# Patient Record
Sex: FEMALE | Race: WHITE | Employment: OTHER | URBAN - METROPOLITAN AREA
[De-identification: names, ages, dates, MRNs, and addresses within clinical notes are randomized per-mention and may not be internally consistent; named-entity substitution may affect disease eponyms.]

---

## 2018-09-27 PROBLEM — E11.21 TYPE 2 DIABETES WITH NEPHROPATHY (HCC): Status: ACTIVE | Noted: 2018-09-27

## 2018-12-01 RX ORDER — LORAZEPAM 0.5 MG/1
TABLET ORAL
Qty: 30 TAB | Refills: 0 | OUTPATIENT
Start: 2018-12-01

## 2019-06-03 RX ORDER — OMEPRAZOLE 20 MG/1
CAPSULE, DELAYED RELEASE ORAL
Qty: 30 CAP | Refills: 0 | Status: SHIPPED | OUTPATIENT
Start: 2019-06-03 | End: 2019-07-15 | Stop reason: SDUPTHER

## 2019-07-15 ENCOUNTER — TELEPHONE (OUTPATIENT)
Dept: GERIATRIC MEDICINE | Age: 84
End: 2019-07-15

## 2019-07-15 RX ORDER — OMEPRAZOLE 20 MG/1
CAPSULE, DELAYED RELEASE ORAL
Qty: 30 CAP | Refills: 0 | Status: SHIPPED | OUTPATIENT
Start: 2019-07-15 | End: 2020-08-27

## 2019-07-16 NOTE — TELEPHONE ENCOUNTER
This is not my patient. I have never seen her. Some how her Omeprazole rx has me attached. I have attempted to decline the refill.

## 2020-02-20 ENCOUNTER — HOSPITAL ENCOUNTER (OUTPATIENT)
Dept: WOUND CARE | Age: 85
Discharge: HOME | End: 2020-02-20
Payer: MEDICARE

## 2020-02-20 VITALS
SYSTOLIC BLOOD PRESSURE: 133 MMHG | RESPIRATION RATE: 16 BRPM | HEART RATE: 96 BPM | DIASTOLIC BLOOD PRESSURE: 71 MMHG | TEMPERATURE: 98.3 F

## 2020-02-20 PROBLEM — S81.811A LACERATION OF RIGHT LOWER EXTREMITY: Status: ACTIVE | Noted: 2020-02-20

## 2020-02-20 PROBLEM — L97.915 NON-PRESSURE CHRONIC ULCER OF RIGHT LOWER LEG WITH MUSCLE INVOLVEMENT WITHOUT EVIDENCE OF NECROSIS (HCC): Status: ACTIVE | Noted: 2020-02-20

## 2020-02-20 PROCEDURE — 99213 OFFICE O/P EST LOW 20 MIN: CPT

## 2020-02-20 PROCEDURE — 11043 DBRDMT MUSC&/FSCA 1ST 20/<: CPT

## 2020-02-20 RX ORDER — AMOXICILLIN 250 MG/1
250 CAPSULE ORAL 3 TIMES DAILY
Qty: 30 CAP | Refills: 0 | Status: SHIPPED | OUTPATIENT
Start: 2020-02-20 | End: 2021-01-08 | Stop reason: ALTCHOICE

## 2020-02-20 NOTE — DISCHARGE INSTRUCTIONS
Discharge Instructions for  [address], [address] [place] [address]  Telephone: [phone]    NAME:  [id number]:  6/9/1932  MEDICAL RECORD NUMBER:  [id number]  DATE:  2/20/2020    Wound Cleansing:   Do not scrub or use excessive force. Cleanse wound prior to applying a clean dressing with:  [x] Normal Saline [x] Keep Wound Dry in Shower    [] Wound Cleanser   [] Cleanse wound with Mild Soap & Water  [] May Shower at Discharge   [] Other:       Dressings:           Wound Location right lower leg   [x] Apply Primary Dressing:       [] MediHoney Gel [] Aquacel ag [] Alginate   [] Collagen [x] Collagen with Silver almita ag   [] Santyl with Moisten saline gauze     [] Hydrocolloid   [] MediHoney Alginate [] Foam with Silver   [] Foam   [x] Hydrofera Blue ready   [] Mepilex Border    [] Moisten with Saline [] Hydrogel [] Mepitel     [] Bactroban/Mupirocin [] Polysporin  [] Other:    [x] Cover and Secure with:     [] Gauze [] Pollo [] Kerlix   [] Ace Wrap [] Cover Roll Tape [] ABD     [x] Other: bordered foam   Avoid contact of tape with skin. [x] Change dressing: [] Daily    [] Every Other Day [] Three times per week   [] Once a week [] Do Not Change Dressing   [x] Other: mondays and thursday     Edema Control:  Apply: [] Compression Stocking []Right Leg []Left Leg   [x] Tubigrip [x]Right Leg Double Layer []Left Leg Double Layer       []Right Leg Single Layer []Left Leg Single Layer   [] SpandaGrip []Right Leg  []Left Leg      []Low compression 5-10 mm/Hg      []Medium compression 10-20 mm/Hg     []High compression  20-30 mm/Hg   every morning immediately when getting up should be applied to affected leg(s) from mid foot to knee making sure to cover the heel. Remove every night before going to bed. [x] Elevate leg(s) above the level of the heart when sitting. [x] Avoid prolonged standing in one place.    [] Elevate arm/hand above the level of the heart []RightArm []LeftArm     Dietary:  [x] Diet as tolerated: [] Calorie Diabetic Diet: [] No Added Salt:  [x] Increase Protein: [] Other:   Activity:  [x] Activity as tolerated:  [] Patient has no activity restrictions     [] Strict Bedrest: [] Remain off Work:     [] May return to full duty work:                                   [] Return to work with restrictions:             Return Appointment:  [] Wound and dressing supply provider:   [x] ECF or Home Healthcare: Referral to [facility]  [] Wound Assessment: [] Physician or NP scheduled for Wound Assessment:   [x] Return Appointment: With Dr. Chad Baltazar  in  [address] [place] [address])  Rx for amoxicillin three times daily. Electronically signed on 2/20/2020 at 9:54 AM     Roberta Amaya 281: Should you experience any significant changes in your wound(s) or have questions about your wound care, please contact the [facility] at [address] [place] [address] 8:00 am - 4:30. If you need help with your wound outside these hours and cannot wait until we are again available, contact your PCP or go to the hospital emergency room. PLEASE NOTE: IF YOU ARE UNABLE TO OBTAIN WOUND SUPPLIES, CONTINUE TO USE THE SUPPLIES YOU HAVE AVAILABLE UNTIL YOU ARE ABLE TO REACH US. IT IS MOST IMPORTANT TO KEEP THE WOUND COVERED AT ALL TIMES.       Physician Signature:_______________________    Date: ___________ Time:  ____________

## 2020-02-20 NOTE — H&P
[address] [place] [address]  HISTORY AND PHYSICAL    Name:  Betty Rasmussen  MR#:  [id number]  :  1932  ACCOUNT #:  [de-identified]  ADMIT DATE:  2020      HISTORY OF PRESENT ILLNESS:  The patient is an 57-year-old female who fell and suffered multiple lacerations without foreign body of the right lateral leg S81.811A, L97.913 on December 15, 2019. She was seen in the emergency room at [facility] and had sutures placed. She then returned to [facility] on  at which time the sutures were removed. Cultures were obtained, which grew E. Coli and normal jerod, for which she has been treated topically but not systemically. PAST MEDICAL HISTORY:  Significant for left breast cancer, fibromyalgia, GERD, elevated cholesterol, osteoporosis, depression, goiter and type 2 diabetes with her most recent A1c of 6.1 on 2020. MEDICATIONS:  Synthroid, lorazepam, Celebrex, niacin, amitriptyline, Paxil, Prilosec, amlodipine. HABITS:  Cigarettes were discontinued in . She denies alcohol. PREVIOUS OPERATIONS:  Left lumpectomy, appendectomy, right total knee replacement, T and A, laminectomy. ALLERGIES:  STATINS WHICH CAUSE MYALGIA, WELCHOL WHICH CAUSES MYALGIA, ZETIA WHICH CAUSE MYALGIA, AND ANASTROZOLE WHICH CAUSES A RASH. FAMILY HISTORY:  Significant for coronary artery heart disease in her mother and brother; cancer in her father with pancreas; brother liver; sister lung; hypertension, sister and brother. The patient presents with an aide who stays with the patient during the day on  and . Her son visits and stays with her every other weekend. Other than that, she has been living alone. She has had multiple falls including one episode where she fell and was under her own bed and was not sure how that happened. She has three walkers in her house but only uses them intermittently.     PHYSICAL EXAMINATION:  VITAL SIGNS:  Her temperature is 98.3, pulse 96, respirations 16, blood pressure 133/71. NEUROLOGIC:  Cranial nerves II through XII grossly intact. HEAD AND NECK:  Oral cavity, oropharynx, palpation of neck is normal.  LUNGS:  Clear to auscultation and percussion. HEART:  Regular rate and rhythm without murmur. ABDOMEN:  Soft, nontender, no organomegaly. BACK:  Nontender to palpation. EXTREMITIES:  Upper extremities:  Equal tone and strength bilaterally. Lower extremities:  She has strong dorsalis pedis and posterior tibialis pulses. ABIs were obtained showing the right 0.99 and the left 1.19. She has a trace of edema on the right lower extremity. WOUND EXAMINATION:  She has a wound of the right lateral lower leg measuring 1.4 x 1.5 x 1 cm. The wound is covered with slough. It was recommended that the wound be debrided. I explained the risks and benefits. The patient and her aide understood and wished to proceed. Therefore, topical Xylocaine 4% gel was applied for 15 minutes. Using a 5-mm ring curette, all devitalized tissue was removed down to healthy bleeding muscle. Hemostasis was achieved with gentle pressure until dry. The wound was irrigated with saline and patted dry and it was coated with a layer of Vashe. ASSESSMENT AND PLAN:  The patient states that her family has a strong history of negative reactions to sulfa and therefore I am going to give her a prescription for amoxicillin 250 mg #30 one p.o. t.i.d. for the E. coli infection of the wound. We are going to dress the wound with Caitlin Ag followed by [facility] Blue and Border Foam along with double Tubigrips. The patient does have compression stockings at home, which she is not currently taking but has tolerated in the past and therefore she can use those if she prefers. I have asked her to keep her leg elevated as much as possible. I asked her to keep the wound dry and not get it wet in the bath or shower.     I called the patient's son because I am quite concerned that she is putting herself at risk by living alone and not having enough support during the day or the nights, and I strongly believe that this woman needs to be in assisted living since she is unable to care for herself adequately and has only intermittent care in the home. Her son understands. He will discuss this with her and come up with a comprehensive plan, hopefully in the near future. I am going to see the patient again in followup in 1 week. Hopefully, we will start noticing an improvement in the wound. I strongly encouraged her to keep her leg elevated as much as she can and that she must use a walker whenever she ambulates in or outside of the home. All questions were answered. Her condition on discharge is stable. If the wound does not improve quickly in spite of gentle compression and leg elevation, we might recommend to her PCP that she be switched from amlodipine to a different class of anti-hypertensive since calcium channel blockers can play a role in lower extremity edema.       Rosita Rachel MD      [id number]  D:  02/20/2020 11:14  T:  02/20/2020 12:46  JOB #:  [id number]  CC:  Tarik West MD

## 2020-02-20 NOTE — WOUND CARE
02/20/20 0931   Wound Leg lower Right   Date First Assessed/Time First Assessed: 02/20/20 0930   Present on Hospital Admission: Yes  Primary Wound Type: Traumatic  Location: Leg lower  Wound Location Orientation: Right   Dressing Status Other (Comment)   Dressing Type Open to air   Wound Length (cm) 1.4 cm   Wound Width (cm) 1.5 cm   Wound Depth (cm) 1 cm   Wound Surface Area (cm^2) 2.1 cm^2   Wound Volume (cm^3) 2.1 cm^3   Condition of Base Pink;Slough   Condition of Edges Open   Drainage Amount Moderate   Drainage Color Serosanguinous   Wound Odor None   Vilma-wound Assessment Intact; Pink     Visit Vitals  /71 (BP 1 Location: Right arm, BP Patient Position: Sitting)   Pulse 96   Temp 98.3 °F (36.8 °C)   Resp 16

## 2020-11-13 ENCOUNTER — HOME HEALTH ADMISSION (OUTPATIENT)
Dept: HOME HEALTH SERVICES | Facility: HOME HEALTH | Age: 85
End: 2020-11-13

## 2021-01-08 ENCOUNTER — OFFICE VISIT (OUTPATIENT)
Dept: NEUROLOGY | Age: 86
End: 2021-01-08
Payer: MEDICARE

## 2021-01-08 VITALS
OXYGEN SATURATION: 98 % | DIASTOLIC BLOOD PRESSURE: 80 MMHG | RESPIRATION RATE: 16 BRPM | SYSTOLIC BLOOD PRESSURE: 140 MMHG | WEIGHT: 166 LBS | BODY MASS INDEX: 28.34 KG/M2 | HEIGHT: 64 IN | HEART RATE: 78 BPM

## 2021-01-08 DIAGNOSIS — R32 URINARY INCONTINENCE, UNSPECIFIED TYPE: ICD-10-CM

## 2021-01-08 DIAGNOSIS — R41.3 MEMORY LOSS: Primary | ICD-10-CM

## 2021-01-08 DIAGNOSIS — Z86.73 HISTORY OF STROKE: ICD-10-CM

## 2021-01-08 PROCEDURE — 99205 OFFICE O/P NEW HI 60 MIN: CPT | Performed by: PSYCHIATRY & NEUROLOGY

## 2021-01-08 PROCEDURE — G8427 DOCREV CUR MEDS BY ELIG CLIN: HCPCS | Performed by: PSYCHIATRY & NEUROLOGY

## 2021-01-08 PROCEDURE — 1100F PTFALLS ASSESS-DOCD GE2>/YR: CPT | Performed by: PSYCHIATRY & NEUROLOGY

## 2021-01-08 PROCEDURE — G8536 NO DOC ELDER MAL SCRN: HCPCS | Performed by: PSYCHIATRY & NEUROLOGY

## 2021-01-08 PROCEDURE — 3288F FALL RISK ASSESSMENT DOCD: CPT | Performed by: PSYCHIATRY & NEUROLOGY

## 2021-01-08 PROCEDURE — 1090F PRES/ABSN URINE INCON ASSESS: CPT | Performed by: PSYCHIATRY & NEUROLOGY

## 2021-01-08 PROCEDURE — G8432 DEP SCR NOT DOC, RNG: HCPCS | Performed by: PSYCHIATRY & NEUROLOGY

## 2021-01-08 PROCEDURE — G8419 CALC BMI OUT NRM PARAM NOF/U: HCPCS | Performed by: PSYCHIATRY & NEUROLOGY

## 2021-01-08 RX ORDER — MEMANTINE HYDROCHLORIDE 10 MG/1
TABLET ORAL
Qty: 180 TAB | Refills: 3 | Status: SHIPPED | OUTPATIENT
Start: 2021-01-08 | End: 2021-02-23 | Stop reason: SINTOL

## 2021-01-08 NOTE — PROGRESS NOTES
Neurology Consult Note      HISTORY PROVIDED BY: patient and caregiver Jessee Kennedy since August, 2020. Chief Complaint:   Chief Complaint   Patient presents with    Memory Loss      Subjective:    Faby Blanton is a 80 y.o. female who presents in consultation for memory loss. Pt reports onset of difficulties about 2 years ago, caregiver reports 4 years at least.  Forgets names, dates, places. Her caregiver reports she loses her train of thought mid-sentence. Poor short term memory, gives example of not remembering a very specific conversation. She is living alone, caregiver is there M-F for various hours during the day. She is not driving since 8915, gave up her license, due to getting lost. [facility] was [facility] at [facility] due to confusion, they suspected UTI, started on an antibiotic. Confusion has improved. +Bladder incontinence. Labs 11/11/20 - , TSH and CMP - unremarkable.      Past Medical History:   Diagnosis Date    Arthritis     Breast cancer ([address]     LEFT BREAST -- Lumpectomy ([facility])    DM (diabetes mellitus) ([address]     Environmental allergies     Fibromyalgia     GERD (gastroesophageal reflux disease)     High cholesterol     Ill-defined condition 12/28/15    RECENT FALL 12/21/15; \"FELLS LIKE SOMETHING CRAWLING UP INSIDE HEAD ON RIGHT SIDE\", HAS HAD HEADACHE/ DIZZINESS SINCE    Ill-defined condition 12/28/15    STATES NEEDS ANOTHER SPINE SURGERY--RUPTURED [address]; HAS \"GROWTH\" AT END OF SPINE; HAS NECK PAIN    Ill-defined condition     NO BP/VENIPUNCTURE LEFT ARM    Osteopenia     Osteoporosis     Other ill-defined conditions(799.89)     trouble swallowing/wt loss - 10 pound loss in two months (6/26/14 pt states she has had this problem for a very long time)    Psychiatric disorder     hx depression    Stroke [facility])     Seen incidentally    Thyroid activity decreased     goiter      Past Surgical History:   Procedure Laterality Date    ENDOSCOPY, COLON, DIAGNOSTIC 2013    Neg. No f/u needed. Kevin Ramos).    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, would [employer] in [place]: Not on file    Food insecurity     Worry: Not on file     Inability: Not on file   Blog[employer] needs     Medical: Not on file     Non-medical: Not on file   Tobacco Use    Smoking status: Former Smoker     Packs/day: 0.00     Quit date: 1964     Years since quittin.3    Smokeless tobacco: Never Used   Substance and Sexual Activity    Alcohol use: No    Drug use: No    Sexual activity: Not on file   Lifestyle    Physical activity     Days per week: Not on file     Minutes per session: Not on file    Stress: Not on file   Relationships    Social connections     Talks on phone: Not on file     Gets together: Not on file     Attends Rastafarian service: Not on file     Active member of club or organization: Not on file     Attends meetings of clubs or organizations: Not on file     Relationship status: Not on file    Intimate partner violence     Fear of current or ex partner: Not on file     Emotionally abused: Not on file     Physically abused: Not on file     Forced sexual activity: Not on file   Other Topics Concern    Not on file   Social History Narrative    [facility]     Family History   Problem Relation Age of Onset    Heart Disease Mother     Cancer Father         pancreatic    Heart Disease Brother 46        CAD    Hypertension Brother     Stroke Brother     Heart Disease Maternal Grandfather     Heart Disease Brother     Hypertension Brother     Stroke Brother     Heart Disease Brother     Hypertension Brother     Cancer Brother         liver    Heart Disease Brother     Cancer Brother         lung    Heart Surgery Brother     Hypertension Brother     Hypertension Sister    [de-identified] Cancer Sister         colon    Cancer Sister         lung    Anesth Problems Neg Hx          Objective:   Review of Systems   Constitutional: Positive for malaise/fatigue. HENT: Negative. Eyes: Negative. Respiratory: Negative. Cardiovascular: Negative. Gastrointestinal: Positive for constipation. Genitourinary: Negative. Incontinence   Musculoskeletal: Positive for joint pain and myalgias. Skin: Negative. Neurological: Positive for weakness. Endo/Heme/Allergies: Negative. Psychiatric/Behavioral: Positive for memory loss. The patient is nervous/anxious. Allergies   Allergen Reactions    Adhesive Rash    Mold Other (comments)     GRASS, WEEDS, DUST: CONGESTION    Statins-Hmg-Coa Reductase Inhibitors Myalgia    Welchol [Colesevelam] Myalgia    Zetia [Ezetimibe] Myalgia    Anastrozole Rash        Meds:  Outpatient Medications Prior to Visit   Medication Sig Dispense Refill    niacin ER (NIASPAN) 500 mg tablet TAKE ONE TABLET BY MOUTH NIGHTLY 30 Tab 11    celecoxib (CELEBREX) 200 mg capsule TAKE ONE CAPSULE BY MOUTH EVERY DAY 30 Cap 5    LORazepam (ATIVAN) 1 mg tablet Take 1 Tab by mouth three (3) times daily as needed for Anxiety. Max Daily Amount: 3 tablets 90 Tab 3    PARoxetine (PAXIL) 20 mg tablet Take 1 Tab by mouth daily.  30 Tab 11    amitriptyline (ELAVIL) 75 mg tablet TAKE ONE TABLET BY MOUTH AT BEDTIME 90 Tab 3    levothyroxine (SYNTHROID) 75 mcg tablet TAKE ONE TABLET BY MOUTH EVERY DAY 90 Tab 3    omeprazole (PRILOSEC) 20 mg capsule TAKE ONE CAPSULE BY MOUTH EVERY DAY 90 Cap 3    amoxicillin (AMOXIL) 250 mg capsule Take 1 Cap by mouth three (3) times daily. 30 Cap 0    senna-docusate (SENOKOT-S) 8.6-50 mg per tablet Take 2 Tabs by mouth daily.  polyethylene glycol (MIRALAX) 17 gram/dose powder Take 17 g by mouth daily.  docusate sodium (COLACE) 100 mg capsule Take 100 mg by mouth two (2) times a day. No facility-administered medications prior to visit. Imaging:  MRI Results (most recent):  Results from [place] encounter on 05/04/16   MRI LUMB SPINE WO CONT    Narrative **Final Report**       ICD Codes / Adm. Diagnosis: 724.02  M48.06 / Spinal stenosis, lumbar region    Spinal stenosis, lumbar arabella  Examination:  MR ZHANG SPINE WO CON  - [id number] - May  4 2016 11:48AM  Accession No:  [id number]  Reason:  Spinal stenosis, lumbar region, without neurogenic claudication      REPORT:  EXAM:  MR ZHANG SPINE WO CON    INDICATION:  Spinal stenosis, lumbar region, without neurogenic   claudication. Spinal surgery history. Several falls since knee surgery 3.5   months ago. Back pain. COMPARISON: 12/12/2014 MRI, 2/4/2015 radiographs    TECHNIQUE: MR imaging of the lumbar spine was performed using the following   sequences: sagittal T1, T2, STIR;  axial T1, T2.     CONTRAST:  None. FINDINGS:    Conus position, morphology and signal appear normal. There is normal   vertebral body height. Noted type I signal changes are demonstrated in the   interval at L3-4, with otherwise normal bone signal. Vertebral body heights   are normal. There is interval anterolisthesis at L3-4 measuring 4 mm. Borderline anterolisthesis at L4-5 is again shown as is borderline   retrolisthesis at L2-3. Imaging directly defects are again shown at L4-L5   with artifacts related to bilateral pedicle screws placement at L4, L5 and   S1, connected with bilateral vertical rods. No paraspinal soft tissue mass   is demonstrated. T10-11: Shown on sagittal images. Minimal central disc bulging. No facet   arthropathy, canal stenosis or foraminal stenosis. T11-12: Shown on sagittal images. Mild disc space narrowing and diffuse disc   bulging, greatest centrally. No facet arthropathy, canal stenosis or   foraminal stenosis. T12-L1: Normal disc height. Minimal central disc bulging. No facet   arthropathy, canal stenosis or foraminal stenosis. L1-L2:  Normal disc height. Minimal central disc bulging. No facet   arthropathy, canal stenosis or foraminal stenosis. No change appear    L2-L3:  Nearly normal disc height. Mild leftward lateralizing diffuse disc   bulging and bilateral facet osteoarthrosis. No canal or foraminal stenosis. No change. L3-L4:  Interval moderate disc space narrowing. Moderate diffuse disc   bulging, lateralizing toward left with interval substantial prominence in   left foraminal and left lateral regions. Moderate bilateral facet   osteoarthrosis. Previously demonstrated a synovial cyst no longer evident. Moderate to severe canal stenosis. Severe left and moderate right foraminal   stenosis. L4-L5:  Unchanged moderate to severe disc space narrowing. No canal or left   foraminal stenosis. Mild-moderate right foraminal stenosis unchanged. L5-S1:  Moderate disc space narrowing. No canal or right foraminal stenosis. Mild left foraminal stenosis. No change. IMPRESSION:  Posterior synovial cyst at L3-4 level no longer demonstrated. Progression of   L3-4 disc space narrowing with interval type I discogenic endplate signal   changes. Progressive L3-4 disc bulging with interval prominence in left   lateral and foraminal regions. L3-4 moderate to severe canal stenosis,   severe left foraminal stenosis and moderate right foraminal stenosis. Signing/Reading Doctor: Elgin Woodall. Mehdi Postin ([id number])    Approved: PASTORA Harding Postin ([id number])  May  4 2016  2:26PM                                   CT Results (most recent):  Results from Hospital Encounter encounter on 10/03/16   CT HEAD W WO CONT    Narrative EXAM:  CT HEAD W WO CONT    INDICATION:   Head pain    COMPARISON: None. CONTRAST:   100 mL of Isovue-300. TECHNIQUE:CT of the head was performed prior to and following uneventful rapid  bolus intravenous administration of contrast.  Brain and bone windows were  generated. CT dose reduction was achieved through use of a standardized  protocol tailored for this examination and automatic exposure control for dose  modulation. FINDINGS:  The ventricles and sulci are normal in size, shape and configuration and  symmetrical and midline. There are mild changes small vessel disease  periventricular white matter. . There is no intracranial hemorrhage, extra-axial  collection, mass, mass effect or midline shift. The basilar cisterns are open. No acute infarct is identified. Following intravenous contrast administration, there is normal enhancement. No  enhancing masses or other abnormal areas of enhancement are identified. The bone windows demonstrate no abnormalities. The visualized portions of the  paranasal sinuses and mastoid air cells are clear. Impression IMPRESSION: No acute abnormality is identified. There are mild changes small  vessel disease periventricular white matter.           Reviewed records in [facility] tab today    Lab Review   Results for orders placed or performed in visit on 37/37/78   METABOLIC PANEL, COMPREHENSIVE   Result Value Ref Range    Glucose 111 (H) 65 - 99 mg/dL    BUN 18 8 - 27 mg/dL    Creatinine 0.91 0.57 - 1.00 mg/dL    GFR est non-AA 57 (L) >59 mL/min/1.73    GFR est AA 65 >59 mL/min/1.73    BUN/Creatinine ratio 20 12 - 28    Sodium 139 134 - 144 mmol/L    Potassium 4.5 3.5 - 5.2 mmol/L    Chloride 102 96 - 106 mmol/L    CO2 24 20 - 29 mmol/L    Calcium 9.7 8.7 - 10.3 mg/dL    Protein, total 7.1 6.0 - 8.5 g/dL    Albumin 4.4 3.6 - 4.6 g/dL    GLOBULIN, TOTAL 2.7 1.5 - 4.5 g/dL    A-G Ratio 1.6 1.2 - 2.2    Bilirubin, total 0.3 0.0 - 1.2 mg/dL    Alk. phosphatase 95 39 - 117 IU/L    AST (SGOT) 21 0 - 40 IU/L    ALT (SGPT) 22 0 - 32 IU/L   TSH 3RD GENERATION   Result Value Ref Range    TSH 0.817 0.450 - 4.500 uIU/mL   T4, FREE   Result Value Ref Range    T4, Free 1.02 0.82 - 1.77 ng/dL   LIPID PANEL   Result Value Ref Range    Cholesterol, total 339 (H) 100 - 199 mg/dL    Triglyceride 199 (H) 0 - 149 mg/dL    HDL Cholesterol 55 >39 mg/dL    VLDL, calculated 40 5 - 40 mg/dL    LDL, calculated 244 (H) 0 - 99 mg/dL    Comment: Comment    AMB POC HEMOGLOBIN A1C   Result Value Ref Range    Hemoglobin A1c (POC) 6.3 (A) 4.8 - 5.6 %   AMB POC URINALYSIS DIP STICK AUTO W/O MICRO   Result Value Ref Range    Color (UA POC) Yellow     Clarity (UA POC) Clear     Glucose (UA POC) Negative Negative    Bilirubin (UA POC) Negative Negative    Ketones (UA POC) Negative Negative    Specific gravity (UA POC) 1.020 1.001 - 1.035    Blood (UA POC) Negative Negative    pH (UA POC) 7.5 4.6 - 8.0    Protein (UA POC) Negative Negative    Urobilinogen (UA POC) 0.2 mg/dL 0.2 - 1    Nitrites (UA POC) Negative Negative    Leukocyte esterase (UA POC) Negative Negative        Exam:  Visit Vitals  BP (!) 140/80 (BP 1 Location: Left arm, BP Patient Position: Sitting)   Pulse 78   Resp 16   Ht 5' 4\" (1.626 m)   Wt 166 lb (75.3 kg)   SpO2 98%   BMI 28.49 kg/m²     General:  Alert, cooperative, no distress. Head:  Normocephalic, without obvious abnormality, atraumatic. Respiratory:  Heart:   Non labored breathing  Regular rate and rhythm, no murmurs   Neck:   2+ carotids, no bruits   Extremities: Warm, no cyanosis or edema. Pulses: 2+ radial pulses. Neurologic:  MS: Alert, speech intact. Language - see MMSE. Attention and fund of knowledge appropriate.   Recent and remote memory impaired  Cranial Nerves:  II: visual fields Full to confrontation   II: pupils Equal, round, reactive to light   II: optic disc III,VII: ptosis none   III,IV,VI: extraocular muscles  EOMI, no nystagmus or diplopia   V: facial light touch sensation  normal   VII: facial muscle function   symmetric   VIII: hearing intact   IX: soft palate elevation  normal   XI: trapezius strength  5/5   XI: sternocleidomastoid strength 5/5   XII: tongue  Midline     Motor: normal bulk and tone, no tremor              Strength: 5/5 throughout, no PD  Sensory: intact to LT, PP  Coordination: FTN and HTS intact, LIZETH intact  Gait: Slightly wide based, unsteady gait  Reflexes: 2+ symmetric in UE, absent in LE, toes mute    Mini Mental State Exam 1/8/2021   What is the Year 1   What is the Season 1   What is the Date 1   What is the Day 1   What is the Month 1   Where are we State 1   Where are we Country 1   Where are we [place] or [place] 0   Where are we Floor 0   Name three objects, then ask the patient to say them 3   Serial sevens Subtract 7 from 100 in increments 3   Ask for the three objects repeated above 1   Name a pencil 1   Name a watch 1   Have the patient repeat this phrase \"No ifs, ands, or buts\" 1   Three stage command: Take the paper in your right hand 1   Fold the paper in half 1   Put the paper on the floor 0   Read and obey the following: CLOSE YOUR EYES 1   Have the patient write a sentence 1   Have the patient copy a figure 0   Mini Mental Score 22   Some recent data might be hidden            Assessment/Plan   Pt is an 80 y.o. female with DM, HLD, and h/o stroke with onset of short term memory loss at least 4 years ago. Exam with MMSE score 22/30 missing 2 orientation, 2 attn, 2 at delayed recall, one step, and unable to copy, wide based unsteady gait, absent LE reflexes and mute toes, o/w non-focal.  Suspect patient has dementia, possibly Alzheimer's versus vascular. Recommend MRI brain to assess for structural etiology including NPH given history of incontinence and strokes.   Recommend vitamin B12/MMA levels to complete work-up for metabolic etiology. Discussed diagnosis of dementia, natural progression of the disease process, and medication to slow memory loss. Patient recalls trying Aricept in the past and had side effects. Will start Namenda 5 mg twice daily x1 month then 10 mg twice daily, side effects reviewed. Follow-up in clinic in approximately 3 months instructed to call in the interim with any questions or concerns. ICD-10-CM ICD-9-CM    1. Memory loss  R41.3 780.93 MRI BRAIN WO CONT      VITAMIN B12      METHYLMALONIC ACID   2. History of stroke  Z86.73 V12.54 MRI BRAIN WO CONT   3. Urinary incontinence, unspecified type  R32 788.30 MRI BRAIN WO CONT       Signed:   Yazan Camacho MD  1/8/2021

## 2021-01-08 NOTE — LETTER
1/15/2021 Patient: Lawanda Waters YOB: 1932 Date of Visit: 1/8/2021 Johnson Alfaro MD 
[facility] [id number] [address] [employer] Dear Johnson Alfaro MD, Thank you for referring Ms. Lawanda Waters to [address] [place] [address] for evaluation. My notes for this consultation are attached. If you have questions, please do not hesitate to call me. I look forward to following your patient along with you. Sincerely, Madeline Gusman MD

## 2021-01-14 ENCOUNTER — HOSPITAL ENCOUNTER (OUTPATIENT)
Dept: MRI IMAGING | Age: 86
Discharge: HOME | End: 2021-01-14
Attending: PSYCHIATRY & NEUROLOGY
Payer: MEDICARE

## 2021-01-14 DIAGNOSIS — R32 URINARY INCONTINENCE, UNSPECIFIED TYPE: ICD-10-CM

## 2021-01-14 DIAGNOSIS — R41.3 MEMORY LOSS: ICD-10-CM

## 2021-01-14 DIAGNOSIS — Z86.73 HISTORY OF STROKE: ICD-10-CM

## 2021-01-14 PROCEDURE — 70551 MRI BRAIN STEM W/O DYE: CPT

## 2021-01-15 LAB
Lab: NORMAL
METHYLMALONATE SERPL-SCNC: 213 NMOL/L (ref 0–378)
VIT B12 SERPL-MCNC: 577 PG/ML (ref 232–1245)

## 2021-01-15 NOTE — PROGRESS NOTES
Jonas Butt - Please call pt/pt's caregiver: MRI brain wo contrast 1/14/21 with age related changes, no mass, no hydrocephalus or other explanation for memory loss. B12/MMA levels look good. We can review these results together at f/u appt.

## 2021-02-05 ENCOUNTER — OFFICE VISIT (OUTPATIENT)
Dept: INTERNAL MEDICINE CLINIC | Age: 86
End: 2021-02-05
Payer: MEDICARE

## 2021-02-05 VITALS
RESPIRATION RATE: 16 BRPM | HEART RATE: 63 BPM | TEMPERATURE: 97.8 F | OXYGEN SATURATION: 93 % | BODY MASS INDEX: 27.31 KG/M2 | WEIGHT: 160 LBS | SYSTOLIC BLOOD PRESSURE: 130 MMHG | HEIGHT: 64 IN | DIASTOLIC BLOOD PRESSURE: 90 MMHG

## 2021-02-05 DIAGNOSIS — Z98.890 STATUS POST LEFT BREAST LUMPECTOMY: ICD-10-CM

## 2021-02-05 DIAGNOSIS — F41.9 ANXIETY: ICD-10-CM

## 2021-02-05 DIAGNOSIS — E03.9 ACQUIRED HYPOTHYROIDISM: ICD-10-CM

## 2021-02-05 DIAGNOSIS — R73.03 PREDIABETES: ICD-10-CM

## 2021-02-05 DIAGNOSIS — N39.41 URGE INCONTINENCE OF URINE: ICD-10-CM

## 2021-02-05 DIAGNOSIS — R41.3 MEMORY IMPAIRMENT: ICD-10-CM

## 2021-02-05 DIAGNOSIS — Z96.651 HISTORY OF TOTAL RIGHT KNEE REPLACEMENT (TKR): ICD-10-CM

## 2021-02-05 DIAGNOSIS — R26.81 GAIT INSTABILITY: ICD-10-CM

## 2021-02-05 DIAGNOSIS — E78.00 HIGH CHOLESTEROL: ICD-10-CM

## 2021-02-05 DIAGNOSIS — K59.09 CHRONIC CONSTIPATION: ICD-10-CM

## 2021-02-05 DIAGNOSIS — M15.9 PRIMARY OSTEOARTHRITIS INVOLVING MULTIPLE JOINTS: Primary | ICD-10-CM

## 2021-02-05 DIAGNOSIS — Z85.3 HISTORY OF BREAST CANCER: ICD-10-CM

## 2021-02-05 DIAGNOSIS — F32.A CHRONIC DEPRESSION: ICD-10-CM

## 2021-02-05 PROCEDURE — G8419 CALC BMI OUT NRM PARAM NOF/U: HCPCS | Performed by: INTERNAL MEDICINE

## 2021-02-05 PROCEDURE — 3288F FALL RISK ASSESSMENT DOCD: CPT | Performed by: INTERNAL MEDICINE

## 2021-02-05 PROCEDURE — G8427 DOCREV CUR MEDS BY ELIG CLIN: HCPCS | Performed by: INTERNAL MEDICINE

## 2021-02-05 PROCEDURE — 99205 OFFICE O/P NEW HI 60 MIN: CPT | Performed by: INTERNAL MEDICINE

## 2021-02-05 PROCEDURE — G0463 HOSPITAL OUTPT CLINIC VISIT: HCPCS | Performed by: INTERNAL MEDICINE

## 2021-02-05 PROCEDURE — G8432 DEP SCR NOT DOC, RNG: HCPCS | Performed by: INTERNAL MEDICINE

## 2021-02-05 PROCEDURE — 1090F PRES/ABSN URINE INCON ASSESS: CPT | Performed by: INTERNAL MEDICINE

## 2021-02-05 PROCEDURE — 1100F PTFALLS ASSESS-DOCD GE2>/YR: CPT | Performed by: INTERNAL MEDICINE

## 2021-02-05 PROCEDURE — G8536 NO DOC ELDER MAL SCRN: HCPCS | Performed by: INTERNAL MEDICINE

## 2021-02-05 RX ORDER — ACETAMINOPHEN 500 MG
TABLET ORAL
Qty: 90 TAB | Status: SHIPPED | OUTPATIENT
Start: 2021-02-05

## 2021-02-05 RX ORDER — ACETAMINOPHEN 500 MG
TABLET ORAL
COMMUNITY
End: 2021-02-05 | Stop reason: SDUPTHER

## 2021-02-05 RX ORDER — DOCUSATE SODIUM 100 MG/1
CAPSULE, LIQUID FILLED ORAL
COMMUNITY
End: 2021-02-05 | Stop reason: ALTCHOICE

## 2021-02-05 RX ORDER — AMOXICILLIN 250 MG
2 CAPSULE ORAL DAILY
Qty: 60 TAB | Refills: 5 | Status: SHIPPED | OUTPATIENT
Start: 2021-02-05 | End: 2022-02-09

## 2021-02-05 RX ORDER — PAROXETINE HYDROCHLORIDE 20 MG/1
TABLET, FILM COATED ORAL
Qty: 30 TAB | Refills: 11 | Status: SHIPPED | OUTPATIENT
Start: 2021-02-05 | End: 2021-02-23 | Stop reason: ALTCHOICE

## 2021-02-05 RX ORDER — LACTULOSE 10 G/15ML
10 SOLUTION ORAL; RECTAL 2 TIMES DAILY
Qty: 480 ML | Refills: 1 | Status: SHIPPED | OUTPATIENT
Start: 2021-02-05 | End: 2022-02-09

## 2021-02-05 RX ORDER — AMITRIPTYLINE HYDROCHLORIDE 75 MG/1
TABLET, FILM COATED ORAL
Qty: 90 TAB | Refills: 0 | Status: SHIPPED | OUTPATIENT
Start: 2021-02-05 | End: 2021-04-12

## 2021-02-05 NOTE — PROGRESS NOTES
Room:     Identified pt with two pt identifiers(name and ). Reviewed record in preparation for visit and have obtained necessary documentation. All patient medications has been reviewed. Chief Complaint   Patient presents with   [address] Maintenance Due   Topic    Foot Exam Q1     Eye Exam Retinal or Dilated     COVID-19 Vaccine (1 of 2)    DTaP/Tdap/Td series (1 - Tdap)    Shingrix Vaccine Age 50> (1 of 2)    GLAUCOMA SCREENING Q2Y     Bone Densitometry (Dexa) Screening     Medicare Yearly Exam     MICROALBUMIN Q1      Last Eye Exam:  2020  Dexa:  Not completed      Vitals:    21 0953   BP: (!) 130/90   Pulse: 63   Resp: 16   Temp: 97.8 °F (36.6 °C)   TempSrc: Temporal   SpO2: 93%   Weight: 160 lb (72.6 kg)   Height: 5' 4\" (1.626 m)   PainSc:   8   PainLoc: Knee       4. Have you been to the ER, urgent care clinic since your last visit? Hospitalized since your last visit? No    5. Have you seen or consulted any other health care providers outside of the [address] [place] [address] since your last visit? Include any pap smears or colon screening. No      Patient is accompanied by adult caretaker I have received verbal consent from [address] [place] [address] to discuss any/all medical information while they are present in the room.

## 2021-02-05 NOTE — PROGRESS NOTES
HISTORY OF PRESENT ILLNESS  Dino Alcala is a 80 y.o. female. New patient who comes in with her caregiver to establish care. I reviewed records in [facility]. Has a few chronic medical issues including generalized arthritic pain, hypothyroidism, HLD, prediabetes, memory impairment, urine incontinence, depression, anxiety, chronic constipation, breast cancer/left lumpectomy and gait instability. Followed by neurologist.  [facility] MRI showed atrophy and vascular changes. She is taking Namenda. Reports having pain all over. Takes Celebrex daily but not much help. Takes Tylenol occasionally. Has chronic constipation. Uses suppositories frequently. Denies abdominal pain or other related symptoms. Has been on Paxil and lorazepam for a long time. Also Elavil 75 mg nightly. Reports issues with urine incontinence and nocturia but no dysuria or hematuria. Has very high LDL but unable to tolerate statins. She is on niacin. Denies history of cardiovascular disease. Has a care giver a few hours during the day. Lives alone in a house. Has a son in town who helps her. I reviewed medication list with her. Labs in 11/2020 were stable except for high cholesterol. Denies tobacco or alcohol use. No other complaints. HPI    Review of Systems   Constitutional: Negative. HENT: Positive for hearing loss. Eyes: Negative. Negative for blurred vision. Respiratory: Negative. Negative for shortness of breath. Cardiovascular: Negative. Negative for chest pain and leg swelling. Gastrointestinal: Positive for constipation and heartburn. Negative for abdominal pain, blood in stool, diarrhea, melena, nausea and vomiting. Genitourinary: Positive for urgency. Negative for dysuria, flank pain and hematuria. Musculoskeletal: Positive for falls and joint pain. Skin: Negative. Neurological: Negative. Negative for dizziness, sensory change, focal weakness and headaches.    Endo/Heme/Allergies: Negative. Negative for polydipsia. Psychiatric/Behavioral: Positive for depression and memory loss. The patient is nervous/anxious and has insomnia. Physical Exam  Vitals signs and nursing note reviewed. Constitutional:       General: She is not in acute distress. Appearance: She is well-developed. Comments: Elderly lady  Seems to get agitated easily   HENT:      Head: Normocephalic and atraumatic. Mouth/Throat:      Mouth: Mucous membranes are moist.      Pharynx: Oropharynx is clear. Eyes:      General: No scleral icterus. Conjunctiva/sclera: Conjunctivae normal.      Pupils: Pupils are equal, round, and reactive to light. Neck:      Musculoskeletal: Normal range of motion and neck supple. Thyroid: No thyromegaly. Vascular: No JVD. Cardiovascular:      Rate and Rhythm: Normal rate and regular rhythm. Heart sounds: Normal heart sounds. No murmur. Pulmonary:      Effort: Pulmonary effort is normal. No respiratory distress. Breath sounds: Normal breath sounds. No wheezing or rales. Abdominal:      General: Bowel sounds are normal. There is no distension. Palpations: Abdomen is soft. Tenderness: There is no abdominal tenderness. There is no guarding. Musculoskeletal:         General: Tenderness (Hands/knees/shoulders/back) and deformity present. No signs of injury. Right lower leg: No edema. Left lower leg: No edema. Comments: DJD changes  Right TKR scar   Lymphadenopathy:      Cervical: No cervical adenopathy. Skin:     General: Skin is warm and dry. Findings: No erythema or rash. Neurological:      Mental Status: She is alert. Cranial Nerves: No cranial nerve deficit.       Coordination: Coordination normal.      Comments: ANO to name, [address], 2021  Knows president's name  Able to answer questions but at times with difficulty   Psychiatric:         Behavior: Behavior normal.      Comments: Seems depressed and anxious         ASSESSMENT and PLAN  Diagnoses and all orders for this visit:    1. Primary osteoarthritis involving multiple joints    2. History of breast cancer with lumpectomy    3. Acquired hypothyroidism    4. High cholesterol    5. Prediabetes    6. Memory impairment    7. Urge incontinence of urine    8. Anxiety    9. Chronic depression    10. Gait instability    11. Chronic constipation    Other orders  -     acetaminophen (Tylenol Extra Strength) 500 mg tablet; 2 every 8 hours  -     senna-docusate (PERICOLACE) 8.6-50 mg per tablet; Take 2 Tabs by mouth daily. -     amitriptyline (ELAVIL) 75 mg tablet; decrease to 1/2 at bedtime for 1 week then stop it  -     PARoxetine (PAXIL) 20 mg tablet; Decrease to 1/2 daily for 1 week then 1/2 every 2 days for 1 week then stop it  -     lactulose (CHRONULAC) 10 gram/15 mL solution; Take 15 mL by mouth two (2) times a day. Follow-up and Dispositions    · Return in about 2 weeks (around 2/19/2021).      lab results and schedule of future lab studies reviewed with patient  reviewed diet, exercise and weight control  reviewed medications and side effects in detail  Fall precautions discussed  COVID-19 precautions discussed with pt  Advised patient to consume enough fiber and drink fluids  Explained to patient that there are no quick fixes for her chronic issues  I suggest tapering amitriptyline and Paxil off gradually since she says did not help and potential side effects   May benefit from Cymbalta  I discussed my findings and plan with her son over the phone as well  After visit summary explained and given to patient and caregiver

## 2021-02-23 ENCOUNTER — OFFICE VISIT (OUTPATIENT)
Dept: INTERNAL MEDICINE CLINIC | Age: 86
End: 2021-02-23
Payer: MEDICARE

## 2021-02-23 VITALS
SYSTOLIC BLOOD PRESSURE: 132 MMHG | BODY MASS INDEX: 27.31 KG/M2 | OXYGEN SATURATION: 99 % | WEIGHT: 160 LBS | RESPIRATION RATE: 16 BRPM | DIASTOLIC BLOOD PRESSURE: 76 MMHG | HEIGHT: 64 IN | TEMPERATURE: 98.1 F | HEART RATE: 78 BPM

## 2021-02-23 DIAGNOSIS — R41.3 MEMORY IMPAIRMENT: ICD-10-CM

## 2021-02-23 DIAGNOSIS — N39.41 URGE INCONTINENCE OF URINE: ICD-10-CM

## 2021-02-23 DIAGNOSIS — K59.09 CHRONIC CONSTIPATION: ICD-10-CM

## 2021-02-23 DIAGNOSIS — R26.81 GAIT INSTABILITY: ICD-10-CM

## 2021-02-23 DIAGNOSIS — F41.9 ANXIETY: ICD-10-CM

## 2021-02-23 DIAGNOSIS — E03.9 ACQUIRED HYPOTHYROIDISM: Primary | ICD-10-CM

## 2021-02-23 DIAGNOSIS — N89.8 VAGINAL IRRITATION: ICD-10-CM

## 2021-02-23 DIAGNOSIS — F32.A CHRONIC DEPRESSION: ICD-10-CM

## 2021-02-23 DIAGNOSIS — K64.9 HEMORRHOIDS, UNSPECIFIED HEMORRHOID TYPE: ICD-10-CM

## 2021-02-23 PROCEDURE — G8419 CALC BMI OUT NRM PARAM NOF/U: HCPCS | Performed by: INTERNAL MEDICINE

## 2021-02-23 PROCEDURE — 3288F FALL RISK ASSESSMENT DOCD: CPT | Performed by: INTERNAL MEDICINE

## 2021-02-23 PROCEDURE — G8536 NO DOC ELDER MAL SCRN: HCPCS | Performed by: INTERNAL MEDICINE

## 2021-02-23 PROCEDURE — 1100F PTFALLS ASSESS-DOCD GE2>/YR: CPT | Performed by: INTERNAL MEDICINE

## 2021-02-23 PROCEDURE — G9717 DOC PT DX DEP/BP F/U NT REQ: HCPCS | Performed by: INTERNAL MEDICINE

## 2021-02-23 PROCEDURE — G8427 DOCREV CUR MEDS BY ELIG CLIN: HCPCS | Performed by: INTERNAL MEDICINE

## 2021-02-23 PROCEDURE — G0463 HOSPITAL OUTPT CLINIC VISIT: HCPCS | Performed by: INTERNAL MEDICINE

## 2021-02-23 PROCEDURE — 1090F PRES/ABSN URINE INCON ASSESS: CPT | Performed by: INTERNAL MEDICINE

## 2021-02-23 PROCEDURE — 99214 OFFICE O/P EST MOD 30 MIN: CPT | Performed by: INTERNAL MEDICINE

## 2021-02-23 NOTE — PROGRESS NOTES
Results for orders placed or performed in visit on 01/08/21   METHYLMALONIC ACID   Result Value Ref Range    METHYLMALONIC ACID, SERUM 213 0 - 378 nmol/L    Disclaimer Comment    VITAMIN B12   Result Value Ref Range    Vitamin B12 577 232 - 1,245 pg/mL       Health Maintenance Due   Topic Date Due    Foot Exam Q1  06/09/1942    Eye Exam Retinal or Dilated  06[birth year]09/1942    COVID-19 Vaccine (1 of 2) 06/09/1948    DTaP/Tdap/Td series (1 - Tdap) 06/09/1953    Shingrix Vaccine Age 50> (1 of 2) 06/09/1982    GLAUCOMA SCREENING Q2Y  06/09/1997    Bone Densitometry (Dexa) Screening  06/09/1997    MICROALBUMIN Q1  02/07/2021    Medicare Yearly Exam  02/07/2021       Chief Complaint   Patient presents with    Other     2 week f/u    Blood sugar problem    Other     Vivid dreams       1. Have you been to the ER, urgent care clinic since your last visit? Hospitalized since your last visit? No    2. Have you seen or consulted any other health care providers outside of the [address] [place] [address] since your last visit? Include any pap smears or colon screening. No    3) Do you have an Advance Directive on file? no    4) Are you interested in receiving information on Advance Directives? NO      Patient is accompanied by nursing attendant I have received verbal consent from [address] [place] [address] to discuss any/all medical information while they are present in the room. \

## 2021-02-28 NOTE — PROGRESS NOTES
HISTORY OF PRESENT ILLNESS  Unique Samuels is a 80 y.o. female. Pt. comes in with her caregiver for f/u. Has a few chronic medical issues as documented. Vital signs are stable. Main complaint today is vaginal and rectal irritation. Has chronic urine incontinence, constipation and hemorrhoids. Lactulose and Vilma-Colace has helped constipation. She is a poor historian. Has chronic depression and anxiety. I adjusted medications recently. She is off Paxil now. Has cut amitriptyline down to 75 mg nightly. Uses Ativan as needed. Gait is slow but no falls. Has chronic arthritic pains. Celebrex helps. No other acute issues today. Denies signs or symptoms of COVID-19. PMH/PSH/Allergies/Social History/medication list and most recent studies reviewed with patient. Tobacco use: No  Alcohol use: No    Reports compliance with medications and diet. Trying to be active physically as tolerated. Reports no other new c/o. HPI    Review of Systems   Constitutional: Negative. HENT: Positive for hearing loss. Eyes: Negative. Negative for blurred vision. Respiratory: Negative. Negative for shortness of breath. Cardiovascular: Negative. Negative for chest pain and leg swelling. Gastrointestinal: Positive for constipation and heartburn. Negative for abdominal pain, blood in stool, diarrhea, melena, nausea and vomiting. Genitourinary: Positive for urgency. Negative for dysuria, flank pain and hematuria. Musculoskeletal: Positive for joint pain. Negative for falls. Skin: Negative. Neurological: Negative. Negative for dizziness, sensory change, focal weakness and headaches. Endo/Heme/Allergies: Negative. Negative for polydipsia. Psychiatric/Behavioral: Positive for depression and memory loss. The patient is nervous/anxious and has insomnia. Physical Exam  Vitals signs and nursing note reviewed. Constitutional:       General: She is not in acute distress.      Appearance: She is well-developed. Comments: Elderly lady  Gets agitated easily   HENT:      Head: Normocephalic and atraumatic. Mouth/Throat:      Mouth: Mucous membranes are moist.      Pharynx: Oropharynx is clear. Eyes:      General: No scleral icterus. Conjunctiva/sclera: Conjunctivae normal.   Neck:      Musculoskeletal: Normal range of motion and neck supple. Thyroid: No thyromegaly. Vascular: No JVD. Cardiovascular:      Rate and Rhythm: Normal rate and regular rhythm. Heart sounds: Normal heart sounds. No murmur. Pulmonary:      Effort: Pulmonary effort is normal. No respiratory distress. Breath sounds: Normal breath sounds. No wheezing or rales. Abdominal:      General: Bowel sounds are normal. There is no distension. Palpations: Abdomen is soft. Tenderness: There is no abdominal tenderness. There is no right CVA tenderness, left CVA tenderness or guarding. Musculoskeletal:         General: Tenderness (Hands/knees/shoulders/back) present. No signs of injury. Right lower leg: No edema. Left lower leg: No edema. Comments: DJD changes  Right TKR scar   Lymphadenopathy:      Cervical: No cervical adenopathy. Skin:     General: Skin is warm and dry. Findings: No erythema or rash. Neurological:      Mental Status: She is alert. Cranial Nerves: No cranial nerve deficit. Coordination: Coordination normal.      Comments: ANO to name, Lalitha, 2021  Knows president's name  Able to answer questions but at times with difficulty   Psychiatric:         Behavior: Behavior normal.      Comments: Seems depressed and anxious         ASSESSMENT and PLAN  Diagnoses and all orders for this visit:    1. Acquired hypothyroidism    2. Memory impairment    3. Urge incontinence of urine    4. Anxiety    5. Chronic depression    6. Gait instability    7. Chronic constipation    8.  Vaginal irritation  -     REFERRAL TO OBSTETRICS AND GYNECOLOGY    9. Hemorrhoids, unspecified hemorrhoid type      Follow-up and Dispositions    · Return in about 3 months (around 5/23/2021).      lab results and schedule of future lab studies reviewed with patient  reviewed diet, exercise and weight control  reviewed medications and side effects in detail  Fall precautions discussed  F/u with other MD's as scheduled  COVID-19 precautions discussed with pt  Reassurance  Overall stable

## 2021-03-03 ENCOUNTER — OFFICE VISIT (OUTPATIENT)
Dept: OBGYN CLINIC | Age: 86
End: 2021-03-03
Payer: MEDICARE

## 2021-03-03 VITALS — DIASTOLIC BLOOD PRESSURE: 70 MMHG | BODY MASS INDEX: 27.81 KG/M2 | WEIGHT: 162 LBS | SYSTOLIC BLOOD PRESSURE: 130 MMHG

## 2021-03-03 DIAGNOSIS — R39.89 SENSATION OF PRESSURE IN BLADDER AREA: ICD-10-CM

## 2021-03-03 DIAGNOSIS — N89.8 VAGINAL IRRITATION: Primary | ICD-10-CM

## 2021-03-03 PROCEDURE — 1090F PRES/ABSN URINE INCON ASSESS: CPT | Performed by: OBSTETRICS & GYNECOLOGY

## 2021-03-03 PROCEDURE — G8419 CALC BMI OUT NRM PARAM NOF/U: HCPCS | Performed by: OBSTETRICS & GYNECOLOGY

## 2021-03-03 PROCEDURE — G8427 DOCREV CUR MEDS BY ELIG CLIN: HCPCS | Performed by: OBSTETRICS & GYNECOLOGY

## 2021-03-03 PROCEDURE — G8536 NO DOC ELDER MAL SCRN: HCPCS | Performed by: OBSTETRICS & GYNECOLOGY

## 2021-03-03 PROCEDURE — 3288F FALL RISK ASSESSMENT DOCD: CPT | Performed by: OBSTETRICS & GYNECOLOGY

## 2021-03-03 PROCEDURE — 99204 OFFICE O/P NEW MOD 45 MIN: CPT | Performed by: OBSTETRICS & GYNECOLOGY

## 2021-03-03 PROCEDURE — 1100F PTFALLS ASSESS-DOCD GE2>/YR: CPT | Performed by: OBSTETRICS & GYNECOLOGY

## 2021-03-03 PROCEDURE — G9717 DOC PT DX DEP/BP F/U NT REQ: HCPCS | Performed by: OBSTETRICS & GYNECOLOGY

## 2021-03-03 NOTE — PROGRESS NOTES
Vaginitis  CC: Vaginal discomfort    HPI: Ms. Homer Villeda is a 80 y.o.  female presenting for evaluation of vaginal discomfort. She has not had previous treatment for this problem. Also bladder pressure    OB History        1    Para   1    Term                AB        Living           SAB        TAB        Ectopic        Molar        Multiple        Live Births                    Past Medical History:   Diagnosis Date    Arthritis     Breast cancer ([facility]     LEFT BREAST -- Lumpectomy ([facility])    DM (diabetes mellitus) ([facility]     Environmental allergies     Fibromyalgia     GERD (gastroesophageal reflux disease)     High cholesterol     Ill-defined condition 12/28/15    RECENT FALL 12/21/15; \"FELLS LIKE SOMETHING CRAWLING UP INSIDE HEAD ON RIGHT SIDE\", HAS HAD HEADACHE/ DIZZINESS SINCE    Ill-defined condition 12/28/15    STATES NEEDS ANOTHER SPINE SURGERY--RUPTURED [address]; HAS \"GROWTH\" AT END OF SPINE; HAS NECK PAIN    Ill-defined condition     NO BP/VENIPUNCTURE LEFT ARM    Osteopenia     Osteoporosis     Other ill-defined conditions(799.89)     trouble swallowing/wt loss - 10 pound loss in two months (14 pt states she has had this problem for a very long time)    Psychiatric disorder     hx depression    Stroke [facility])     Seen incidentally    Thyroid activity decreased     goiter       Past Surgical History:   Procedure Laterality Date    ENDOSCOPY, COLON, DIAGNOSTIC  2013    Neg. No f/u needed. Miah Glover).     HX APPENDECTOMY      HX HEMORRHOIDECTOMY      HX KNEE REPLACEMENT Right 2016    HX TONSILLECTOMY      AL BREAST SURGERY PROCEDURE UNLISTED  2014    left breast lumpectomy, PARTIAL MASTECTOMY    AL SPINE SURGERY PROCEDURE UNLISTED  1/10    L spine; LAMINECTOMY, FUSION       Family History   Problem Relation Age of Onset    Heart Disease Mother     Cancer Father         pancreatic    Heart Disease Brother 46        CAD    Hypertension Brother     Stroke Brother     Heart Disease Maternal Grandfather     Heart Disease Brother     Hypertension Brother     Stroke Brother     Heart Disease Brother     Hypertension Brother     Cancer Brother         liver    Heart Disease Brother     Cancer Brother         lung    Heart Surgery Brother     Hypertension Brother     Hypertension Sister    [facility] Cancer Sister         colon    Cancer Sister         lung    Anesth Problems Neg Hx        Social History     Socioeconomic History    Marital status:      Spouse name: Not on file    Number of children: Not on file    Years of education: Not on file    Highest education level: Not on file   Occupational History    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, would [employer] in [address] [place] [address] strain: Not on file    Food insecurity     Worry: Not on file     Inability: Not on file   [employer] needs     Medical: Not on file     Non-medical: Not on file   Tobacco Use    Smoking status: Former Smoker     Packs/day: 0.00     Quit date: 1964     Years since quittin.3    Smokeless tobacco: Never Used   Substance and Sexual Activity    Alcohol use: No    Drug use: No    Sexual activity: Not on file   Lifestyle    Physical activity     Days per week: Not on file     Minutes per session: Not on file    Stress: Not on file   Relationships    Social connections     Talks on phone: Not on file     Gets together: Not on file     Attends [employer] service: Not on file     Active member of club or organization: Not on file     Attends meetings of clubs or organizations: Not on file     Relationship status: Not on file    Intimate partner violence     Fear of current or ex partner: Not on file     Emotionally abused: Not on file     Physically abused: Not on file     Forced sexual activity: Not on file   Other Topics Concern    Not on file   Social History Narrative    Lives in [place] alone       Current Outpatient Medications   Medication Sig Dispense Refill    acetaminophen (Tylenol Extra Strength) 500 mg tablet 2 every 8 hours 90 Tab prn    senna-docusate (PERICOLACE) 8.6-50 mg per tablet Take 2 Tabs by mouth daily. 60 Tab 5    amitriptyline (ELAVIL) 75 mg tablet decrease to 1/2 at bedtime for 1 week then stop it 90 Tab 0    lactulose (CHRONULAC) 10 gram/15 mL solution Take 15 mL by mouth two (2) times a day. 480 mL 1    levothyroxine (SYNTHROID) 75 mcg tablet TAKE ONE TABLET BY MOUTH EVERY DAY 90 Tab 3    niacin ER (NIASPAN) 500 mg tablet TAKE ONE TABLET BY MOUTH NIGHTLY 30 Tab 11    celecoxib (CELEBREX) 200 mg capsule TAKE ONE CAPSULE BY MOUTH EVERY DAY 30 Cap 5    LORazepam (ATIVAN) 1 mg tablet Take 1 Tab by mouth three (3) times daily as needed for Anxiety.  Max Daily Amount: 3 tablets 90 Tab 3    omeprazole (PRILOSEC) 20 mg capsule TAKE ONE CAPSULE BY MOUTH EVERY DAY 90 Cap 3       Allergies   Allergen Reactions    Adhesive Rash    Mold Other (comments)     GRASS, WEEDS, DUST: CONGESTION    Statins-Hmg-Coa Reductase Inhibitors Myalgia    Welchol [Colesevelam] Myalgia    Zetia [Ezetimibe] Myalgia    Anastrozole Rash       Physical Exam    Visit Vitals  /70 (BP 1 Location: Right arm, BP Patient Position: Sitting)   Wt 162 lb (73.5 kg)   BMI 27.81 kg/m²       HENT  · Head and Face: appears normal    Genitourinary  · External Genitalia: normal appearance for age, no discharge present, no tenderness present, no inflammatory lesions present, no masses present, no atrophy present  · Vagina: normal vaginal vault without central or paravaginal defects, no inflammatory lesions present, no masses present, no discharge present  · Bladder: non-tender to palpation  · Urethra: appears normal  · Cervix: normal, no cervicitis, no CMT  · Perineum: perineum within normal limits, no evidence of trauma, no rashes or skin lesions present    Skin  · General Inspection: no rash, no lesions identified    Neurologic/Psychiatric  · Mental Status:  · Orientation: grossly oriented to person, place and time  · Mood and Affect: mood normal, affect appropriate    Results for orders placed or performed in visit on 01/08/21   METHYLMALONIC ACID   Result Value Ref Range    METHYLMALONIC ACID, SERUM 213 0 - 378 nmol/L    Disclaimer Comment    VITAMIN B12   Result Value Ref Range    Vitamin B12 577 232 - 1,245 pg/mL         Assessment/Plan:  Vaginitis with bladder pressure  Urine cx sent    Counseled on safe sexual practices (barrier contraception). Reviewed vulvar/vaginal hygiene and irritant avoidance. RTC: for annual , or sooner prn for problems or concerns. Handouts and instructions provided.     Ryan Blackmon  3/3/2021  2:51 PM    Signed By: Leticia Gilmore MD     March 4, 2021

## 2021-03-05 LAB
BACTERIA SPEC CULT: NORMAL
SERVICE CMNT-IMP: NORMAL

## 2021-03-08 ENCOUNTER — TELEPHONE (OUTPATIENT)
Dept: INTERNAL MEDICINE CLINIC | Age: 86
End: 2021-03-08

## 2021-03-08 DIAGNOSIS — F32.A CHRONIC DEPRESSION: Primary | ICD-10-CM

## 2021-03-08 RX ORDER — PAROXETINE 10 MG/1
10 TABLET, FILM COATED ORAL DAILY
Qty: 30 TAB | Refills: 1 | Status: SHIPPED | OUTPATIENT
Start: 2021-03-08 | End: 2021-03-09 | Stop reason: ALTCHOICE

## 2021-03-08 NOTE — TELEPHONE ENCOUNTER
Writer conferred with provider:    She told me she was feeling better last time off medications   Can restart Paxil 10 mg daily and increase to 20 MG  over the next week or so if needed    Message text

## 2021-03-08 NOTE — TELEPHONE ENCOUNTER
Depressions worse since stopping paxil  Also have very vivid dreams shes is working hard and they are very startling needs to know what to do

## 2021-03-08 NOTE — TELEPHONE ENCOUNTER
Call placed to caregiver Rylan House and made aware of new order and advised if no improvement to call office and schedule a VV

## 2021-03-09 ENCOUNTER — VIRTUAL VISIT (OUTPATIENT)
Dept: INTERNAL MEDICINE CLINIC | Age: 86
End: 2021-03-09
Payer: MEDICARE

## 2021-03-09 DIAGNOSIS — M79.7 FIBROMYALGIA: ICD-10-CM

## 2021-03-09 DIAGNOSIS — R41.3 MEMORY IMPAIRMENT: ICD-10-CM

## 2021-03-09 DIAGNOSIS — F32.A CHRONIC DEPRESSION: Primary | ICD-10-CM

## 2021-03-09 DIAGNOSIS — R26.81 GAIT INSTABILITY: ICD-10-CM

## 2021-03-09 DIAGNOSIS — F41.9 ANXIETY: ICD-10-CM

## 2021-03-09 PROCEDURE — G8428 CUR MEDS NOT DOCUMENT: HCPCS | Performed by: INTERNAL MEDICINE

## 2021-03-09 PROCEDURE — 1100F PTFALLS ASSESS-DOCD GE2>/YR: CPT | Performed by: INTERNAL MEDICINE

## 2021-03-09 PROCEDURE — G8419 CALC BMI OUT NRM PARAM NOF/U: HCPCS | Performed by: INTERNAL MEDICINE

## 2021-03-09 PROCEDURE — G8536 NO DOC ELDER MAL SCRN: HCPCS | Performed by: INTERNAL MEDICINE

## 2021-03-09 PROCEDURE — 3288F FALL RISK ASSESSMENT DOCD: CPT | Performed by: INTERNAL MEDICINE

## 2021-03-09 PROCEDURE — 1090F PRES/ABSN URINE INCON ASSESS: CPT | Performed by: INTERNAL MEDICINE

## 2021-03-09 PROCEDURE — G9717 DOC PT DX DEP/BP F/U NT REQ: HCPCS | Performed by: INTERNAL MEDICINE

## 2021-03-09 PROCEDURE — G0463 HOSPITAL OUTPT CLINIC VISIT: HCPCS | Performed by: INTERNAL MEDICINE

## 2021-03-09 PROCEDURE — 99214 OFFICE O/P EST MOD 30 MIN: CPT | Performed by: INTERNAL MEDICINE

## 2021-03-09 RX ORDER — HYDROXYZINE 25 MG/1
25 TABLET, FILM COATED ORAL
Qty: 30 TAB | Refills: 0 | Status: SHIPPED | OUTPATIENT
Start: 2021-03-09 | End: 2021-03-30

## 2021-03-09 RX ORDER — DULOXETIN HYDROCHLORIDE 30 MG/1
30 CAPSULE, DELAYED RELEASE ORAL DAILY
Qty: 90 CAP | Refills: 1 | Status: SHIPPED | OUTPATIENT
Start: 2021-03-09 | End: 2021-04-12

## 2021-03-09 NOTE — PROGRESS NOTES
Results for orders placed or performed in visit on 03/03/21   CULTURE, URINE    Specimen: Urine   Result Value Ref Range    Special Requests: NO SPECIAL REQUESTS      Culture result: No significant growth, <10,000 CFU/mL         Health Maintenance Due   Topic Date Due    Foot Exam Q1  Never done    Eye Exam Retinal or Dilated  Never done    COVID-19 Vaccine (1 of 2) Never done    DTaP/Tdap/Td series (1 - Tdap) 06/09/1953    Shingrix Vaccine Age 50> (1 of 2) Never done    GLAUCOMA SCREENING Q2Y  Never done    Bone Densitometry (Dexa) Screening  Never done    Medicare Yearly Exam  02/07/2021    MICROALBUMIN Q1  02/07/2021    Pneumococcal 65+ years (2 of 2 - PPSV23) 02/28/2021       Chief Complaint   Patient presents with    Generalized Body Aches    Hypothyroidism       1. Have you been to the ER, urgent care clinic since your last visit? Hospitalized since your last visit? No     2. Have you seen or consulted any other health care providers outside of the [address] [place] [address] since your last visit? Include any pap smears or colon screening. No    3) Do you have an Advance Directive on file? no    4) Are you interested in receiving information on Advance Directives? NO      Patient is accompanied by adult caretaker I have received verbal consent from [address] [place] [address] to discuss any/all medical information while they are present in the room.

## 2021-03-09 NOTE — PROGRESS NOTES
Buckner Runner is a 80 y.o. female, evaluated via audio-only technology on 3/9/2021 for Generalized Body Aches, Allergic Reaction, Sad, Decreased Appetite, and Other (Hot flashes)  . Assessment & Plan:   Diagnoses and all orders for this visit:    1. Chronic depression    2. Memory impairment    3. Fibromyalgia  -     DULoxetine (CYMBALTA) 30 mg capsule; Take 1 Cap by mouth daily. 4. Gait instability    5. Anxiety  -     hydrOXYzine HCL (ATARAX) 25 mg tablet; Take 1 Tab by mouth three (3) times daily as needed for Itching or Anxiety for up to 10 days. Suggested to start Cymbalta and stop Paxil for pain and depression. 12  Subjective:     Patient was seen for a follow up. Patient is spoken to via phone with her caregiver. Reports that she has been in pain all the time for awhile now. Reports falls recently. Has been on several medications in the past and reports a fentanyl patch at one time. States that she take 2 tylenol daily and the Celebrex. Reports that there has been a decrease in appetite. And is not eating much during the day. Has been encourage to increase water in fiber intake     In the last few weeks her Paxil was stopped and then restarted again. Reports that she has having constipation, that was then relieved by lactulose. States that she has been having some itching for several weeks now. Caregiver reports more redness to areas on the arms and armpits. No SOB, CP. Reports no changes in products or new foods. Prior to Admission medications    Medication Sig Start Date End Date Taking? Authorizing Provider   PARoxetine (PAXIL) 10 mg tablet Take 1 Tab by mouth daily. 3/8/21  Yes Jessica Avina, DO   acetaminophen (Tylenol Extra Strength) 500 mg tablet 2 every 8 hours 2/5/21  Yes Darshan King, DO   senna-docusate (PERICOLACE) 8.6-50 mg per tablet Take 2 Tabs by mouth daily.  2/5/21  Yes Travis Avina, DO   amitriptyline (ELAVIL) 75 mg tablet decrease to 1/2 at bedtime for 1 week then stop it 2/5/21  Yes Travis Avina DO   lactulose (CHRONULAC) 10 gram/15 mL solution Take 15 mL by mouth two (2) times a day. 2/5/21  Yes Marlyn Meyer DO   levothyroxine (SYNTHROID) 75 mcg tablet TAKE ONE TABLET BY MOUTH EVERY DAY 1/26/21  Yes Mark Lauren MD   niacin ER (NIASPAN) 500 mg tablet TAKE ONE TABLET BY MOUTH NIGHTLY 12/26/20  Yes Mark Lauren MD   celecoxib (CELEBREX) 200 mg capsule TAKE ONE CAPSULE BY MOUTH EVERY DAY 11/19/20  Yes Mark Lauren MD   LORazepam (ATIVAN) 1 mg tablet Take 1 Tab by mouth three (3) times daily as needed for Anxiety.  Max Daily Amount: 3 tablets 11/14/20  Yes Mark Lauren MD   omeprazole (PRILOSEC) 20 mg capsule TAKE ONE CAPSULE BY MOUTH EVERY DAY 4/30/18  Yes Mark Lauren MD     Patient Active Problem List   Diagnosis Code    High cholesterol E78.00    Acquired hypothyroidism E03.9    GERD (gastroesophageal reflux disease) K21.9    [facility] C50.919    Encounter for long-term (current) drug use Z79.899    Hypothyroidism due to acquired atrophy of thyroid E03.4    Primary osteoarthritis of right knee M17.11    Type 2 diabetes with nephropathy ([facility] E11.21    Laceration of right lower extremity S81.811A    Non-pressure chronic ulcer of right lower leg with muscle involvement without evidence of necrosis ([facility] L97.915    Primary osteoarthritis involving multiple joints M89.49    Prediabetes R73.03    Memory impairment R41.3    Chronic depression F32.9    Anxiety F41.9    Urge incontinence of urine N39.41    Gait instability R26.81    Chronic constipation K59.09    History of breast cancer Z85.3    History of total right knee replacement (TKR) Z96.651    Status post left breast lumpectomy Z98.890    Vaginal irritation N89.8    Hemorrhoids, unspecified hemorrhoid type K64.9     Patient Active Problem List    Diagnosis Date Noted    Vaginal irritation 02/23/2021    Hemorrhoids, unspecified hemorrhoid type 02/23/2021    Primary osteoarthritis involving multiple joints 02/05/2021    Prediabetes 02/05/2021    Memory impairment 02/05/2021    Chronic depression 02/05/2021    Anxiety 02/05/2021    Urge incontinence of urine 02/05/2021    Gait instability 02/05/2021    Chronic constipation 02/05/2021    History of breast cancer 02/05/2021    History of total right knee replacement (TKR) 02/05/2021    Status post left breast lumpectomy 02/05/2021    Laceration of right lower extremity 02/20/2020    Non-pressure chronic ulcer of right lower leg with muscle involvement without evidence of necrosis ([facility] 02/20/2020    Type 2 diabetes with nephropathy ([facility] 09/27/2018    Primary osteoarthritis of right knee 01/19/2016    Hypothyroidism due to acquired atrophy of thyroid 01/14/2016    Encounter for long-term (current) drug use 11/24/2015    [facility] cancer ([facility] 01/23/2015    High cholesterol     Acquired hypothyroidism     GERD (gastroesophageal reflux disease)      Current Outpatient Medications   Medication Sig Dispense Refill    DULoxetine (CYMBALTA) 30 mg capsule Take 1 Cap by mouth daily. 90 Cap 1    hydrOXYzine HCL (ATARAX) 25 mg tablet Take 1 Tab by mouth three (3) times daily as needed for Itching or Anxiety for up to 10 days. 30 Tab 0    acetaminophen (Tylenol Extra Strength) 500 mg tablet 2 every 8 hours 90 Tab prn    senna-docusate (PERICOLACE) 8.6-50 mg per tablet Take 2 Tabs by mouth daily. 60 Tab 5    amitriptyline (ELAVIL) 75 mg tablet decrease to 1/2 at bedtime for 1 week then stop it 90 Tab 0    lactulose (CHRONULAC) 10 gram/15 mL solution Take 15 mL by mouth two (2) times a day.  480 mL 1    levothyroxine (SYNTHROID) 75 mcg tablet TAKE ONE TABLET BY MOUTH EVERY DAY 90 Tab 3    niacin ER (NIASPAN) 500 mg tablet TAKE ONE TABLET BY MOUTH NIGHTLY 30 Tab 11    celecoxib (CELEBREX) 200 mg capsule TAKE ONE CAPSULE BY MOUTH EVERY DAY 30 Cap 5    LORazepam (ATIVAN) 1 mg tablet Take 1 Tab by mouth three (3) times daily as needed for Anxiety. Max Daily Amount: 3 tablets 90 Tab 3    omeprazole (PRILOSEC) 20 mg capsule TAKE ONE CAPSULE BY MOUTH EVERY DAY 90 Cap 3     Allergies   Allergen Reactions    Adhesive Rash    Mold Other (comments)     GRASS, WEEDS, DUST: CONGESTION    Statins-Hmg-Coa Reductase Inhibitors Myalgia    Welchol [Colesevelam] Myalgia    Zetia [Ezetimibe] Myalgia    Anastrozole Rash     Past Medical History:   Diagnosis Date    Arthritis     Breast cancer ([facility]     LEFT BREAST -- Lumpectomy ([place])    DM (diabetes mellitus) ([facility]     Environmental allergies     Fibromyalgia     GERD (gastroesophageal reflux disease)     High cholesterol     Ill-defined condition 12/28/15    RECENT FALL 12/21/15; \"FELLS LIKE SOMETHING CRAWLING UP INSIDE HEAD ON RIGHT SIDE\", HAS HAD HEADACHE/ DIZZINESS SINCE    Ill-defined condition 12/28/15    STATES NEEDS ANOTHER SPINE SURGERY--RUPTURED DISC; HAS \"GROWTH\" AT END OF SPINE; HAS NECK PAIN    Ill-defined condition     NO BP/VENIPUNCTURE LEFT ARM    Osteopenia     Osteoporosis     Other ill-defined conditions(799.89)     trouble swallowing/wt loss - 10 pound loss in two months (6/26/14 pt states she has had this problem for a very long time)    Psychiatric disorder     hx depression    Stroke [facility])     Seen incidentally    Thyroid activity decreased     goiter     Past Surgical History:   Procedure Laterality Date    ENDOSCOPY, COLON, DIAGNOSTIC  6/2013    Neg. No f/u needed. Claudia Nagel).     HX APPENDECTOMY      HX HEMORRHOIDECTOMY      HX KNEE REPLACEMENT Right 01/2016    HX TONSILLECTOMY      [place] BREAST SURGERY PROCEDURE UNLISTED  6/2014    left breast lumpectomy, PARTIAL MASTECTOMY    [place] SPINE SURGERY PROCEDURE UNLISTED  1/10    L spine; LAMINECTOMY, FUSION     Family History   Problem Relation Age of Onset    Heart Disease Mother     Cancer Father         pancreatic    Heart Disease Brother 46        CAD    Hypertension Brother     Stroke Brother     Heart Disease Maternal Grandfather     Heart Disease Brother     Hypertension Brother     Stroke Brother     Heart Disease Brother     Hypertension Brother     Cancer Brother         liver    Heart Disease Brother     Cancer Brother         lung    Heart Surgery Brother     Hypertension Brother     Hypertension Sister    Floydene Ada Cancer Sister         colon    Cancer Sister         lung    Anesth Problems Neg Hx        Review of Systems   Constitutional: Negative. Respiratory: Negative. Cardiovascular: Negative. Gastrointestinal: Negative. Negative for constipation and diarrhea. Musculoskeletal: Positive for myalgias. Neurological: Negative for dizziness and headaches. Psychiatric/Behavioral: Positive for depression and memory loss. Patient-Reported Vitals 3/9/2021   Patient-Reported Weight [id number], who was evaluated through a patient-initiated, synchronous (real-time) audio only encounter, and/or her healthcare decision maker, is aware that it is a billable service, with coverage as determined by her insurance carrier. She provided verbal consent to proceed: Yes. She has not had a related appointment within my department in the past 7 days or scheduled within the next 24 hours. Total Time: minutes: 36     Tayler Le NP                                                                                                                 Consent For Provider Observation  [facility] (\"[facility]") has agreed to permit a provider employed by [facility] (\"Observer\") to observe care to patients treated in its physician practices. Your physician has agreed to permit such Observer to observe his/her patient care activities. Such observation will not include any direct participation in the care provided to you.     This writer has obtained verbal permission from this patient to permit Observer to observe their medical care received at [facility]. This patient agrees that they have been given the opportunity to refuse to give such consent and that they may withdraw their consent at any time, except to the extent [address] [place] [address] has relied on this consent and an Observer has already observed their medical care. This consent shall remain in effect for 1 year, unless otherwise withdrawn by this patient.

## 2021-03-11 ENCOUNTER — OFFICE VISIT (OUTPATIENT)
Dept: OBGYN CLINIC | Age: 86
End: 2021-03-11

## 2021-03-11 VITALS — WEIGHT: 156.5 LBS | HEIGHT: 64 IN | BODY MASS INDEX: 26.72 KG/M2

## 2021-03-11 DIAGNOSIS — R39.89 SENSATION OF PRESSURE IN BLADDER AREA: Primary | ICD-10-CM

## 2021-03-11 PROCEDURE — G8427 DOCREV CUR MEDS BY ELIG CLIN: HCPCS | Performed by: OBSTETRICS & GYNECOLOGY

## 2021-03-11 PROCEDURE — G8536 NO DOC ELDER MAL SCRN: HCPCS | Performed by: OBSTETRICS & GYNECOLOGY

## 2021-03-11 PROCEDURE — 1090F PRES/ABSN URINE INCON ASSESS: CPT | Performed by: OBSTETRICS & GYNECOLOGY

## 2021-03-11 PROCEDURE — 1100F PTFALLS ASSESS-DOCD GE2>/YR: CPT | Performed by: OBSTETRICS & GYNECOLOGY

## 2021-03-11 PROCEDURE — G8419 CALC BMI OUT NRM PARAM NOF/U: HCPCS | Performed by: OBSTETRICS & GYNECOLOGY

## 2021-03-11 PROCEDURE — G9717 DOC PT DX DEP/BP F/U NT REQ: HCPCS | Performed by: OBSTETRICS & GYNECOLOGY

## 2021-03-11 PROCEDURE — 3288F FALL RISK ASSESSMENT DOCD: CPT | Performed by: OBSTETRICS & GYNECOLOGY

## 2021-03-11 PROCEDURE — 99213 OFFICE O/P EST LOW 20 MIN: CPT | Performed by: OBSTETRICS & GYNECOLOGY

## 2021-03-12 NOTE — PROGRESS NOTES
Patient presents today to discuss ultrasound results. She is very concerned about pain all over including her hands and feet. THE UTERUS IS RETROVERTED, NORMAL IN SIZE, AND ECHOGENICITY. ENDOMETRIUM APPEARS HETEROGENEOUS AND THICKENED MEASURING 9.6 MM. THERE APPEARS TO  BE A ECHOGENIC FOCI SEEN WITHIN MEASURING 4 X 4 MM. THE RIGHT OVARY NOT VISUALIED DUE TO BOWEL GAS. RIGHT ADNEXA APPEARS WNL. THE LEFT OVARY APPEARS ENLARGED AND APPEARS TO HAVE A ECHOGENIC FOCI MEASURING 11 X 5  X 6 MM. NO FREE FLUID SEEN IN THE CDS. We discussed findings of US today with patient, caregiver present, and Son Mario-via phone. We discussed possible workup to rule out malignancy of the ovary and malignancy of the uterus. After discussion. Lashell and Chente Cody elect for expectant mgmt now given patients age-which I feel is reasonable. If they change their minds in the future, discussed EMB and .     Signed By: Betty Ruiz MD     March 12, 2021

## 2021-03-30 DIAGNOSIS — F41.9 ANXIETY: ICD-10-CM

## 2021-03-30 RX ORDER — HYDROXYZINE 25 MG/1
25 TABLET, FILM COATED ORAL
Qty: 30 TAB | Refills: 0 | Status: SHIPPED | OUTPATIENT
Start: 2021-03-30 | End: 2021-04-09

## 2021-04-12 PROBLEM — S81.811A LACERATION OF RIGHT LOWER EXTREMITY: Status: RESOLVED | Noted: 2020-02-20 | Resolved: 2021-04-12

## 2021-04-12 PROBLEM — M15.9 PRIMARY OSTEOARTHRITIS INVOLVING MULTIPLE JOINTS: Status: RESOLVED | Noted: 2021-02-05 | Resolved: 2021-04-12

## 2021-04-12 PROBLEM — F03.90 DEMENTIA (HCC): Status: ACTIVE | Noted: 2021-04-12

## 2021-04-12 PROBLEM — L97.915 NON-PRESSURE CHRONIC ULCER OF RIGHT LOWER LEG WITH MUSCLE INVOLVEMENT WITHOUT EVIDENCE OF NECROSIS (HCC): Status: RESOLVED | Noted: 2020-02-20 | Resolved: 2021-04-12

## 2021-04-15 ENCOUNTER — OFFICE VISIT (OUTPATIENT)
Dept: NEUROLOGY | Age: 86
End: 2021-04-15
Payer: MEDICARE

## 2021-04-15 VITALS
SYSTOLIC BLOOD PRESSURE: 140 MMHG | OXYGEN SATURATION: 98 % | DIASTOLIC BLOOD PRESSURE: 80 MMHG | HEART RATE: 106 BPM | WEIGHT: 155 LBS | HEIGHT: 64 IN | BODY MASS INDEX: 26.46 KG/M2

## 2021-04-15 DIAGNOSIS — F02.80 LATE ONSET ALZHEIMER'S DEMENTIA WITHOUT BEHAVIORAL DISTURBANCE (HCC): Primary | ICD-10-CM

## 2021-04-15 DIAGNOSIS — G30.1 LATE ONSET ALZHEIMER'S DEMENTIA WITHOUT BEHAVIORAL DISTURBANCE (HCC): Primary | ICD-10-CM

## 2021-04-15 PROCEDURE — G8427 DOCREV CUR MEDS BY ELIG CLIN: HCPCS | Performed by: PSYCHIATRY & NEUROLOGY

## 2021-04-15 PROCEDURE — 3288F FALL RISK ASSESSMENT DOCD: CPT | Performed by: PSYCHIATRY & NEUROLOGY

## 2021-04-15 PROCEDURE — G9717 DOC PT DX DEP/BP F/U NT REQ: HCPCS | Performed by: PSYCHIATRY & NEUROLOGY

## 2021-04-15 PROCEDURE — 99214 OFFICE O/P EST MOD 30 MIN: CPT | Performed by: PSYCHIATRY & NEUROLOGY

## 2021-04-15 PROCEDURE — 1100F PTFALLS ASSESS-DOCD GE2>/YR: CPT | Performed by: PSYCHIATRY & NEUROLOGY

## 2021-04-15 PROCEDURE — 1090F PRES/ABSN URINE INCON ASSESS: CPT | Performed by: PSYCHIATRY & NEUROLOGY

## 2021-04-15 PROCEDURE — G8536 NO DOC ELDER MAL SCRN: HCPCS | Performed by: PSYCHIATRY & NEUROLOGY

## 2021-04-15 PROCEDURE — G8419 CALC BMI OUT NRM PARAM NOF/U: HCPCS | Performed by: PSYCHIATRY & NEUROLOGY

## 2021-04-15 NOTE — PROGRESS NOTES
Neurology Consult Note      HISTORY PROVIDED BY: patient and caregiver Kindra Putnam since August, 2020. Chief Complaint:   Chief Complaint   Patient presents with    Follow-up    Memory Loss      Subjective:   Pt is an 80 y.o. female initially and last seen on 1/8/21 with DM, HLD, and h/o stroke with onset of short term memory loss at least 4 years ago. Exam with MMSE score 22/30 missing 2 orientation, 2 attn, 2 at delayed recall, one step, and unable to copy, wide based unsteady gait, absent LE reflexes and mute toes, o/w non-focal.  Suspected dementia, possibly Alzheimer's versus vascular. Recommended MRI brain to assess for structural etiology including NPH given history of incontinence and strokes. Recommended vitamin B12/MMA levels to complete work-up for metabolic etiology. Discussed diagnosis of dementia, natural progression of the disease process, and medication to slow memory loss. Patient recalls trying Aricept in the past and had side effects. Started Namenda 5 mg twice daily x1 month then 10 mg twice daily. She returns for f/u. MRI brain wo contrast 1/14/21 with age related changes, no mass, no hydrocephalus or other explanation for memory loss. B12/MMA levels were normal.  She only took the Namenda for only 3 nights, c/o having dreams. She wants me to read all the side effects of the medication to her, she knows she had some of them. Still having imbalance, increased pain all over not just because of arthritis or fibromyalgia. She states she feels like she is burning on the inside every morning and has cramps. She was seen by a Gerontologist, Dr. Marya Blum who stopped several meds including Paxil and amitriptyline. Her caregiver relays that the pt fired her recently, but then called her asking if she would bring her to this appointment.       Past Medical History:   Diagnosis Date    Arthritis     Breast cancer ([facility]     LEFT BREAST -- Lumpectomy ([place])    Dementia ([facility] 4/12/2021    DM (diabetes mellitus) ([facility]     Environmental allergies     Fibromyalgia     GERD (gastroesophageal reflux disease)     High cholesterol     Ill-defined condition 12/28/15    RECENT FALL 12/21/15; \"FELLS LIKE SOMETHING CRAWLING UP INSIDE HEAD ON RIGHT SIDE\", HAS HAD HEADACHE/ DIZZINESS SINCE    Ill-defined condition 12/28/15    STATES NEEDS ANOTHER SPINE SURGERY--RUPTURED [address]; HAS \"GROWTH\" AT END OF SPINE; HAS NECK PAIN    Ill-defined condition     NO BP/VENIPUNCTURE LEFT ARM    Osteopenia     Osteoporosis     Other ill-defined conditions(799.89)     trouble swallowing/wt loss - 10 pound loss in two months (14 pt states she has had this problem for a very long time)    Psychiatric disorder     hx depression    Stroke [facility])     Seen incidentally    Thyroid activity decreased     goiter      Past Surgical History:   Procedure Laterality Date    ENDOSCOPY, COLON, DIAGNOSTIC  2013    Neg. No f/u needed. [facility]).    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, would [employer] [place] [address] [facility] [address] [facility]: Not on file    Food insecurity     Worry: Not on file     Inability: Not on file   [email or web address] needs     Medical: Not on file     Non-medical: Not on file   Tobacco Use    Smoking status: Former Smoker     Packs/day: 0.00     Quit date: 1964     Years since quittin.5    Smokeless tobacco: Never Used   Substance and Sexual Activity    Alcohol use: No    Drug use: No    Sexual activity: Not on file   Lifestyle    Physical activity     Days per week: Not on file     Minutes per session: Not on file    Stress: Not on file   Relationships    Social connections     Talks on phone: Not on file     Gets together: Not on file     Attends Baptism service: Not on file     Active member of club or organization: Not on file     Attends meetings of clubs or organizations: Not on file     Relationship status: Not on file    Intimate partner violence     Fear of current or ex partner: Not on file     Emotionally abused: Not on file     Physically abused: Not on file     Forced sexual activity: Not on file   Other Topics Concern    Not on file   Social History Narrative    [facility]     Family History   Problem Relation Age of Onset    Heart Disease Mother     Cancer Father         pancreatic    Heart Disease Brother 46        CAD    Hypertension Brother     Stroke Brother     Heart Disease Maternal Grandfather     Heart Disease Brother     Hypertension Brother     Stroke Brother     Heart Disease Brother     Hypertension Brother     Cancer Brother         liver    Heart Disease Brother     Cancer Brother         lung    Heart Surgery Brother     Hypertension Brother     Hypertension Sister     Cancer Sister         colon    Cancer Sister         lung    Anesth Problems Neg Hx          Objective:   ROS: Per HPI, but limited due to pt's dementia. Allergies   Allergen Reactions    Adhesive Rash    Mold Other (comments)     GRASS, WEEDS, DUST: CONGESTION    Statins-Hmg-Coa Reductase Inhibitors Myalgia    Welchol [Colesevelam] Myalgia    Zetia [Ezetimibe] Myalgia    Anastrozole Rash        Meds:  Outpatient Medications Prior to Visit   Medication Sig Dispense Refill    LORazepam (ATIVAN) 1 mg tablet Take 1 Tab by mouth three (3) times daily as needed for Anxiety.  Max Daily Amount: 3 tablets 90 Tab 5  acetaminophen (Tylenol Extra Strength) 500 mg tablet 2 every 8 hours 90 Tab prn    senna-docusate (PERICOLACE) 8.6-50 mg per tablet Take 2 Tabs by mouth daily. (Patient taking differently: Take 2 Tabs by mouth daily as needed.) 60 Tab 5    lactulose (CHRONULAC) 10 gram/15 mL solution Take 15 mL by mouth two (2) times a day. (Patient taking differently: Take 10 g by mouth two (2) times daily as needed (constipation). ) 480 mL 1    levothyroxine (SYNTHROID) 75 mcg tablet TAKE ONE TABLET BY MOUTH EVERY DAY 90 Tab 3    niacin ER (NIASPAN) 500 mg tablet TAKE ONE TABLET BY MOUTH NIGHTLY 30 Tab 11    celecoxib (CELEBREX) 200 mg capsule TAKE ONE CAPSULE BY MOUTH EVERY DAY 30 Cap 5    omeprazole (PRILOSEC) 20 mg capsule TAKE ONE CAPSULE BY MOUTH EVERY DAY 90 Cap 3     No facility-administered medications prior to visit. Imaging:  MRI Results (most recent):  Results from Hospital Encounter encounter on 01/14/21   MRI BRAIN WO CONT    Narrative CLINICAL HISTORY: Pt with memory loss with reported h/o stroke, stroke risk  factors, recent incontinence. Eval for structural etiology. INDICATION: Pt with memory loss with reported h/o stroke, stroke risk factors,  recent incontinence. Eval for structural etiology. COMPARISON: NONE    TECHNIQUE: MR examination of the brain includes axial and sagittal T1, coronal  T2, axial T2, axial FLAIR, axial gradient echo, axial DWI. CONTRAST: None    FINDINGS:   There is no intracranial mass, hemorrhage or evidence of acute infarction. Pontomesencephalic ratio is within normal limits. There is no evidence of  hydrocephalus. There is mild prominence of the parieto-occipital sulcus. Confluent periventricular and scattered foci of increased T2 signal intensity in  the corona radiata and centrum semiovale. The left vertebral artery is dominant. The brain architecture is normal. There is no evidence of midline shift or  mass-effect. There are no extra-axial fluid collections. There is no Chiari or  syrinx. The pituitary and infundibulum are grossly unremarkable. There is no  skull base mass. Cerebellopontine angles are grossly unremarkable. The major  intracranial vascular flow-voids are unremarkable. The cavernous sinuses are  symmetric. Optic chiasm and infundibulum grossly unremarkable. Orbits are  grossly symmetric. Dural venous sinuses are grossly patent. The mastoid air cells are well pneumatized and clear. Impression IMPRESSION:  Moderate to severe temporal parietal predominant cerebral atrophy and moderate  chronic microvascular ischemic change. No evidence of hydrocephalus or structural abnormality. There is no intracranial mass, hemorrhage or evidence of acute infarction. No acute intracranial process is demonstrated. CT Results (most recent):  Results from Hospital Encounter encounter on 10/03/16   CT HEAD W WO CONT    Narrative EXAM:  CT HEAD W WO CONT    INDICATION:   Head pain    COMPARISON: None. CONTRAST:   100 mL of Isovue-300. TECHNIQUE:CT of the head was performed prior to and following uneventful rapid  bolus intravenous administration of contrast.  Brain and bone windows were  generated. CT dose reduction was achieved through use of a standardized  protocol tailored for this examination and automatic exposure control for dose  modulation. FINDINGS:  The ventricles and sulci are normal in size, shape and configuration and  symmetrical and midline. There are mild changes small vessel disease  periventricular white matter. . There is no intracranial hemorrhage, extra-axial  collection, mass, mass effect or midline shift. The basilar cisterns are open. No acute infarct is identified. Following intravenous contrast administration, there is normal enhancement. No  enhancing masses or other abnormal areas of enhancement are identified. The bone windows demonstrate no abnormalities.  The visualized portions of the  paranasal sinuses and mastoid air cells are clear. Impression IMPRESSION: No acute abnormality is identified. There are mild changes small  vessel disease periventricular white matter. Reviewed records in [employer] and ID90T tab today    Lab Review   Results for orders placed or performed in visit on 04/12/21   CBC WITH AUTOMATED DIFF   Result Value Ref Range    WBC 5.8 3.4 - 10.8 x10E3/uL    RBC 4.85 3.77 - 5.28 x10E6/uL    HGB 14.9 11.1 - 15.9 g/dL    HCT 43.2 34.0 - 46.6 %    MCV 89 79 - 97 fL    MCH 30.7 26.6 - 33.0 pg    MCHC 34.5 31.5 - 35.7 g/dL    RDW 13.0 11.7 - 15.4 %    PLATELET 576 572 - 357 x10E3/uL    NEUTROPHILS 50 Not Estab. %    Lymphocytes 34 Not Estab. %    MONOCYTES 10 Not Estab. %    EOSINOPHILS 4 Not Estab. %    BASOPHILS 2 Not Estab. %    ABS. NEUTROPHILS 2.9 1.4 - 7.0 x10E3/uL    Abs Lymphocytes 2.0 0.7 - 3.1 x10E3/uL    ABS. MONOCYTES 0.6 0.1 - 0.9 x10E3/uL    ABS. EOSINOPHILS 0.3 0.0 - 0.4 x10E3/uL    ABS. BASOPHILS 0.1 0.0 - 0.2 x10E3/uL    IMMATURE GRANULOCYTES 0 Not Estab. %    ABS. IMM. GRANS. 0.0 0.0 - 0.1 M77P0/FG   METABOLIC PANEL, COMPREHENSIVE   Result Value Ref Range    Glucose 125 (H) 65 - 99 mg/dL    BUN 12 8 - 27 mg/dL    Creatinine 0.94 0.57 - 1.00 mg/dL    GFR est non-AA 54 (L) >59 mL/min/1.73    GFR est AA 63 >59 mL/min/1.73    BUN/Creatinine ratio 13 12 - 28    Sodium 138 134 - 144 mmol/L    Potassium 4.6 3.5 - 5.2 mmol/L    Chloride 100 96 - 106 mmol/L    CO2 21 20 - 29 mmol/L    Calcium 10.3 8.7 - 10.3 mg/dL    Protein, total 7.3 6.0 - 8.5 g/dL    Albumin 4.7 (H) 3.6 - 4.6 g/dL    GLOBULIN, TOTAL 2.6 1.5 - 4.5 g/dL    A-G Ratio 1.8 1.2 - 2.2    Bilirubin, total 0.3 0.0 - 1.2 mg/dL    Alk.  phosphatase 104 39 - 117 IU/L    AST (SGOT) 21 0 - 40 IU/L    ALT (SGPT) 16 0 - 32 IU/L   T4, FREE   Result Value Ref Range    T4, Free 1.65 0.82 - 1.77 ng/dL   TSH 3RD GENERATION   Result Value Ref Range    TSH 0.112 (L) 0.450 - 4.500 uIU/mL   LIPID PANEL   Result Value Ref Range Cholesterol, total 314 (H) 100 - 199 mg/dL    Triglyceride 263 (H) 0 - 149 mg/dL    HDL Cholesterol 59 >39 mg/dL    VLDL, calculated 52 (H) 5 - 40 mg/dL    LDL, calculated 203 (H) 0 - 99 mg/dL   AMB POC HEMOGLOBIN A1C   Result Value Ref Range    Hemoglobin A1c (POC) 6.6 (A) 4.8 - 5.6 %   AMB POC URINALYSIS DIP STICK AUTO W/O MICRO   Result Value Ref Range    Color (UA POC) Yellow     Clarity (UA POC) Clear     Glucose (UA POC) Negative Negative    Bilirubin (UA POC) Negative Negative    Ketones (UA POC) Negative Negative    Specific gravity (UA POC) 1.010 1.001 - 1.035    Blood (UA POC) Negative Negative    pH (UA POC) 7.0 4.6 - 8.0    Protein (UA POC) Negative Negative    Urobilinogen (UA POC) 0.2 mg/dL 0.2 - 1    Nitrites (UA POC) Negative Negative    Leukocyte esterase (UA POC) Negative Negative        Exam:  Visit Vitals  BP (!) 140/80 Comment: retaking 130/80   Pulse (!) 106   Ht 5' 4\" (1.626 m)   Wt 155 lb (70.3 kg)   SpO2 98%   BMI 26.61 kg/m²     General:  Alert, cooperative, no distress. Head:  Normocephalic, without obvious abnormality, atraumatic. Respiratory:  Heart:   Non labored breathing  Regular rate and rhythm, no murmurs   Neck:   2+ carotids, no bruits   Extremities: Warm, no cyanosis or edema. Pulses: 2+ radial pulses. Neurologic:  MS: Alert, speech intact. Language - see MMSE. Attention and fund of knowledge appropriate.   Recent and remote memory impaired  Cranial Nerves:  II: visual fields Full to confrontation   II: pupils Equal, round, reactive to light   II: optic disc    III,VII: ptosis none   III,IV,VI: extraocular muscles  EOMI, no nystagmus or diplopia   V: facial light touch sensation  normal   VII: facial muscle function   symmetric   VIII: hearing intact   IX: soft palate elevation  normal   XI: trapezius strength  5/5   XI: sternocleidomastoid strength 5/5   XII: tongue  Midline     Motor: normal bulk and tone, no tremor              Strength: 5/5 throughout, no PD  Sensory: intact to LT, PP  Coordination: FTN and HTS intact, LIZETH intact  Gait: Slightly wide based, unsteady gait  Reflexes: 2+ symmetric in UE, absent in LE, toes mute    Mini Mental State Exam 1/8/2021   What is the Year 1   What is the Season 1   What is the Date 1   What is the Day 1   What is the Month 1   Where are we State 1   Where are we Country 1   Where are we [place] or [place] 0   Where are we Floor 0   Name three objects, then ask the patient to say them 3   Serial sevens Subtract 7 from 100 in increments 3   Ask for the three objects repeated above 1   Name a pencil 1   Name a watch 1   Have the patient repeat this phrase \"No ifs, ands, or buts\" 1   Three stage command: Take the paper in your right hand 1   Fold the paper in half 1   Put the paper on the floor 0   Read and obey the following: CLOSE YOUR EYES 1   Have the patient write a sentence 1   Have the patient copy a figure 0   Mini Mental Score 22   Some recent data might be hidden            Assessment/Plan   Pt is an 80 y.o. female with DM, HLD, and h/o stroke presenting in January, 2021 with onset of short term memory loss beginning at least 4 years ago. Exam in January with MMSE score 22/30 missing 2 orientation, 2 attn, 2 at delayed recall, one step, and unable to copy, wide based unsteady gait, absent LE reflexes and mute toes, o/w non-focal.  Pt has dementia, probable Alzheimer's. Pt is intolerant of medications. Her mood and pain complaints have increased since stopping paxil and amitriptyline. Pt feels that this visit is a waste of time and is not interested in ongoing f/u. Unfortunately, she has little insight into her deficits. I feel that she should have 24/7 supervision at this point for safety. Her caregiver will relay this information to the pt's son and we will have the [employer] SW reach out to her son. Follow-up if needed. ICD-10-CM ICD-9-CM    1.  Late onset Alzheimer's dementia without behavioral disturbance (HCC)  G30.1 331.0     F02.80 294.10      I spent a total of 30 minutes in both face-to-face activities and non-face-to-face activities for the visit on the date of this encounter. Signed:   Kavitha Davidson MD  4/15/2021

## 2021-04-15 NOTE — LETTER
4/27/2021 Patient: Cam Coello YOB: 1932 Date of Visit: 4/15/2021 Ernestine Garces MD 
[facility] [id number] [address] [employer] Dear Ernestine Garces MD, Thank you for referring Ms. Cam Coello to [address] [place] [address] for evaluation. My notes for this consultation are attached. If you have questions, please do not hesitate to call me. I look forward to following your patient along with you. Sincerely, Addison Camacho MD

## 2021-04-15 NOTE — PATIENT INSTRUCTIONS
You have dementia, probable Alzheimers. Testing has excluded other causes of memory loss. Unfortunately, patients with Alzheimers often do not have insight into their deficits which can cause safety issues. These safety issues can be environmental, related to medications, and financial pitfalls. I do not feel that Ms. Lilian Marr should be left alone due to these safety issues and lack of insight. For more information regarding resources in the community and trouble shooting difficult situations, I recommend contacting the [employer]. We will reach out to the SW at the [employer] contact her son.

## 2021-05-13 ENCOUNTER — TRANSCRIBE ORDER (OUTPATIENT)
Dept: INTERNAL MEDICINE CLINIC | Age: 86
End: 2021-05-13

## 2021-05-17 ENCOUNTER — DOCUMENTATION ONLY (OUTPATIENT)
Dept: INTERNAL MEDICINE CLINIC | Age: 86
End: 2021-05-17

## 2021-05-17 RX ORDER — HYDROXYZINE 25 MG/1
TABLET, FILM COATED ORAL
Qty: 30 TAB | Refills: 0 | Status: SHIPPED
Start: 2021-05-17 | End: 2021-12-14

## 2021-05-17 NOTE — PROGRESS NOTES
Chief Complaint   Patient presents with    Other      Pt has been seen by our office and another primary care. Spoke with pts son and advised that pt should have [facility] for pts health care management. Pts son states pt will continue seeing . Our office will no long refill medications.

## 2021-05-24 ENCOUNTER — TELEPHONE (OUTPATIENT)
Dept: NEUROLOGY | Age: 86
End: 2021-05-24

## 2021-05-24 NOTE — TELEPHONE ENCOUNTER
Last office note and contact information were forwarded to [facility]  to reach out to the patient.

## 2021-07-06 DIAGNOSIS — M79.7 FIBROMYALGIA: ICD-10-CM

## 2021-07-07 RX ORDER — DULOXETIN HYDROCHLORIDE 30 MG/1
30 CAPSULE, DELAYED RELEASE ORAL DAILY
Qty: 90 CAPSULE | Refills: 0 | Status: SHIPPED
Start: 2021-07-07 | End: 2021-12-14

## 2022-02-08 ENCOUNTER — HOSPITAL ENCOUNTER (EMERGENCY)
Age: 87
Discharge: HOME | DRG: 392 | End: 2022-02-08
Attending: EMERGENCY MEDICINE
Payer: MEDICARE

## 2022-02-08 ENCOUNTER — APPOINTMENT (OUTPATIENT)
Dept: CT IMAGING | Age: 87
DRG: 392 | End: 2022-02-08
Attending: EMERGENCY MEDICINE
Payer: MEDICARE

## 2022-02-08 ENCOUNTER — APPOINTMENT (OUTPATIENT)
Dept: ULTRASOUND IMAGING | Age: 87
DRG: 392 | End: 2022-02-08
Attending: EMERGENCY MEDICINE
Payer: MEDICARE

## 2022-02-08 VITALS
HEART RATE: 97 BPM | RESPIRATION RATE: 18 BRPM | TEMPERATURE: 97.5 F | DIASTOLIC BLOOD PRESSURE: 93 MMHG | SYSTOLIC BLOOD PRESSURE: 156 MMHG | OXYGEN SATURATION: 97 %

## 2022-02-08 DIAGNOSIS — K83.8 DILATED BILE DUCT: ICD-10-CM

## 2022-02-08 DIAGNOSIS — R19.7 DIARRHEA, UNSPECIFIED TYPE: Primary | ICD-10-CM

## 2022-02-08 LAB
ALBUMIN SERPL-MCNC: 3.8 G/DL (ref 3.5–5)
ALBUMIN/GLOB SERPL: 1 {RATIO} (ref 1.1–2.2)
ALP SERPL-CCNC: 104 U/L (ref 45–117)
ALT SERPL-CCNC: 23 U/L (ref 12–78)
ANION GAP SERPL CALC-SCNC: 9 MMOL/L (ref 5–15)
APPEARANCE UR: CLEAR
AST SERPL-CCNC: 20 U/L (ref 15–37)
BACTERIA URNS QL MICRO: NEGATIVE /HPF
BASOPHILS # BLD: 0.1 K/UL (ref 0–0.1)
BASOPHILS NFR BLD: 1 % (ref 0–1)
BILIRUB SERPL-MCNC: 0.3 MG/DL (ref 0.2–1)
BILIRUB UR QL: NEGATIVE
BUN SERPL-MCNC: 13 MG/DL (ref 6–20)
BUN/CREAT SERPL: 11 (ref 12–20)
CALCIUM SERPL-MCNC: 9.6 MG/DL (ref 8.5–10.1)
CHLORIDE SERPL-SCNC: 106 MMOL/L (ref 97–108)
CO2 SERPL-SCNC: 21 MMOL/L (ref 21–32)
COLOR UR: ABNORMAL
COMMENT, HOLDF: NORMAL
CREAT SERPL-MCNC: 1.17 MG/DL (ref 0.55–1.02)
DIFFERENTIAL METHOD BLD: NORMAL
EOSINOPHIL # BLD: 0.2 K/UL (ref 0–0.4)
EOSINOPHIL NFR BLD: 2 % (ref 0–7)
EPITH CASTS URNS QL MICRO: ABNORMAL /LPF
ERYTHROCYTE [DISTWIDTH] IN BLOOD BY AUTOMATED COUNT: 13 % (ref 11.5–14.5)
GLOBULIN SER CALC-MCNC: 3.8 G/DL (ref 2–4)
GLUCOSE SERPL-MCNC: 180 MG/DL (ref 65–100)
GLUCOSE UR STRIP.AUTO-MCNC: NEGATIVE MG/DL
HCT VFR BLD AUTO: 44.4 % (ref 35–47)
HGB BLD-MCNC: 14.4 G/DL (ref 11.5–16)
HGB UR QL STRIP: NEGATIVE
HYALINE CASTS URNS QL MICRO: ABNORMAL /LPF (ref 0–5)
IMM GRANULOCYTES # BLD AUTO: 0 K/UL (ref 0–0.04)
IMM GRANULOCYTES NFR BLD AUTO: 0 % (ref 0–0.5)
KETONES UR QL STRIP.AUTO: ABNORMAL MG/DL
LEUKOCYTE ESTERASE UR QL STRIP.AUTO: NEGATIVE
LIPASE SERPL-CCNC: 134 U/L (ref 73–393)
LYMPHOCYTES # BLD: 1.8 K/UL (ref 0.8–3.5)
LYMPHOCYTES NFR BLD: 25 % (ref 12–49)
MCH RBC QN AUTO: 30.2 PG (ref 26–34)
MCHC RBC AUTO-ENTMCNC: 32.4 G/DL (ref 30–36.5)
MCV RBC AUTO: 93.1 FL (ref 80–99)
MONOCYTES # BLD: 0.7 K/UL (ref 0–1)
MONOCYTES NFR BLD: 10 % (ref 5–13)
MUCOUS THREADS URNS QL MICRO: ABNORMAL /LPF
NEUTS SEG # BLD: 4.3 K/UL (ref 1.8–8)
NEUTS SEG NFR BLD: 62 % (ref 32–75)
NITRITE UR QL STRIP.AUTO: NEGATIVE
NRBC # BLD: 0 K/UL (ref 0–0.01)
NRBC BLD-RTO: 0 PER 100 WBC
PH UR STRIP: 5.5 [PH] (ref 5–8)
PLATELET # BLD AUTO: 245 K/UL (ref 150–400)
PMV BLD AUTO: 9.7 FL (ref 8.9–12.9)
POTASSIUM SERPL-SCNC: 3.7 MMOL/L (ref 3.5–5.1)
PROT SERPL-MCNC: 7.6 G/DL (ref 6.4–8.2)
PROT UR STRIP-MCNC: ABNORMAL MG/DL
RBC # BLD AUTO: 4.77 M/UL (ref 3.8–5.2)
RBC #/AREA URNS HPF: ABNORMAL /HPF (ref 0–5)
SAMPLES BEING HELD,HOLD: NORMAL
SODIUM SERPL-SCNC: 136 MMOL/L (ref 136–145)
SP GR UR REFRACTOMETRY: 1.02 (ref 1–1.03)
UR CULT HOLD, URHOLD: NORMAL
UROBILINOGEN UR QL STRIP.AUTO: 0.2 EU/DL (ref 0.2–1)
WBC # BLD AUTO: 7 K/UL (ref 3.6–11)
WBC URNS QL MICRO: ABNORMAL /HPF (ref 0–4)

## 2022-02-08 PROCEDURE — 70450 CT HEAD/BRAIN W/O DYE: CPT

## 2022-02-08 PROCEDURE — 85025 COMPLETE CBC W/AUTO DIFF WBC: CPT

## 2022-02-08 PROCEDURE — 83690 ASSAY OF LIPASE: CPT

## 2022-02-08 PROCEDURE — 74011000636 HC RX REV CODE- 636: Performed by: EMERGENCY MEDICINE

## 2022-02-08 PROCEDURE — 74177 CT ABD & PELVIS W/CONTRAST: CPT

## 2022-02-08 PROCEDURE — 76705 ECHO EXAM OF ABDOMEN: CPT

## 2022-02-08 PROCEDURE — 81001 URINALYSIS AUTO W/SCOPE: CPT

## 2022-02-08 PROCEDURE — 80053 COMPREHEN METABOLIC PANEL: CPT

## 2022-02-08 PROCEDURE — 36415 COLL VENOUS BLD VENIPUNCTURE: CPT

## 2022-02-08 PROCEDURE — 99283 EMERGENCY DEPT VISIT LOW MDM: CPT

## 2022-02-08 RX ADMIN — IOPAMIDOL 100 ML: 755 INJECTION, SOLUTION INTRAVENOUS at 12:54

## 2022-02-08 NOTE — SENIOR SERVICES NOTE
Senior services consult received and appreciated. Chart Reviewed. Extensive PMHx to include MDD, anxiety, Type II DM, hypothyroid, elevated cholesterol, and osteoarthritis of left knee. Patient here today for abdominal pain and diarrhea x 3 weeks, increased sadness and \"banging in head. \"     Patient greeted in room with son, Owen Grajeda. Patient is AOX4, appears unkept and a little disheveled, emotional but made good eye contact. I introduced myself and role as SSED NP.   -We had an elaborate detailed conversation that echoed exactly what she told Kevin Flores Counselor. I gave encouragement on transitioning into the new [facility], [address] [facility], [address] [facility]. She is looking forward to the move. -We discussed the connection of the gut-brain axis and the effect of depression and anxiety on our GI system, patient verbalized that she could see a connection.   -I encouraged the patient to look to the future and to say her peace with the past and try to be focused in the present and appreciate that her one and only son is supporting her in the new transition.   -Briefly reviewed today's lab results and provided reassurance.   -We discussed any possible needs at home, patient states that she has a rollator or walker if needed and cannot think of anything she needs.   -Patient voiced that she used to go to the [facility] frequently to do water aerobics and work out voices that she would like to get back into that after she transitions to the group home. Stating that she knows her helped her physically and mentally. Lastly, patient is safe to transition home with support of son. I encouraged the son to reach out to patients PCP after the transition into group home and ask for [facility] in order to help patient transition more smoothly. Thank you for the opportunity to participate in this patients care.      Katt Merritt NP  Senior Services ED [facility]   4:05 PM  [phone]

## 2022-02-08 NOTE — ED PROVIDER NOTES
Murali Koch is an 79 yo F with multiple complaints. She states that she has had diarrhea and crampy abdominal pain for the past 3 weeks. She has been taking imodium which has helped but the diarrhea keeps returning. She went to her PCP about 2 weeks ago for the diarrhea and her Stool PCR pathogens panel and C-diff were both normal.  She has also had posterior headache and sinus pain for the past year which she would also like addressed. She states she has also been dealing with depression and anxiety due to an upcoming move into assisted living. She denies suicidal ideations. Past Medical History:   Diagnosis Date    Breast cancer ([facility] [address]     LEFT BREAST -- Lumpectomy ([facility])    Dementia ([facility] [address] 4/12/2021    DM (diabetes mellitus) ([facility] [address]     Environmental allergies     Fibromyalgia     GERD (gastroesophageal reflux disease)     High cholesterol     Osteoporosis     Psychiatric disorder     hx depression    Stroke [facility])     Seen incidentally    Thyroid activity decreased     goiter       Past Surgical History:   Procedure Laterality Date    ENDOSCOPY, COLON, DIAGNOSTIC  6/2013    Neg. No f/u needed. Sofia Norris).     HX APPENDECTOMY      HX HEMORRHOIDECTOMY      HX KNEE REPLACEMENT Right 01/2016    HX TONSILLECTOMY      CO BREAST SURGERY PROCEDURE UNLISTED  6/2014    left breast lumpectomy, PARTIAL MASTECTOMY    CO SPINE SURGERY PROCEDURE UNLISTED  1/10    L spine; LAMINECTOMY, FUSION         Family History:   Problem Relation Age of Onset    Heart Disease Mother     Cancer Father         pancreatic    Heart Disease Brother 46        CAD    Hypertension Brother     Stroke Brother     Heart Disease Maternal Grandfather     Heart Disease Brother     Hypertension Brother     Stroke Brother     Heart Disease Brother     Hypertension Brother     Cancer Brother         liver    Heart Disease Brother     Cancer Brother         lung    Heart Surgery Brother     Hypertension Brother     Hypertension Sister     Cancer Sister         colon    Cancer Sister         lung    Anesth Problems Neg Hx        Social History     Socioeconomic History    Marital status:      Spouse name: Not on file    Number of children: Not on file    Years of education: Not on file    Highest education level: Not on file   Occupational History    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, 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   Tobacco Use    Smoking status: Former Smoker     Packs/day: 0.00     Quit date: 1964     Years since quittin.4    Smokeless tobacco: Never Used   Substance and Sexual Activity    Alcohol use: No    Drug use: No    Sexual activity: Not on file   Other Topics Concern    Not on file   Social History Narrative    [facility]     Social Determinants of Health     Financial Resource Strain:     Difficulty of Paying Living Expenses: Not on file   Food Insecurity:     Worried About [address] in the Last Year: Not on file    [address] in the Last Year: Not on file   Transportation Needs:     Lack of Transportation (Medical): Not on file    Lack of Transportation (Non-Medical):  Not on file   Physical Activity:     Days of Exercise per Week: Not on file    Minutes of Exercise per Session: Not on file   Stress:     Feeling of Stress : Not on file   Social Connections:     Frequency of Communication with Friends and Family: Not on file    Frequency of Social Gatherings with Friends and Family: Not on file    Attends Quaker Services: Not on file    Active Member of Clubs or Organizations: Not on file    Attends Club or Organization Meetings: Not on file    Marital Status: Not on file   Intimate Partner Violence:     Fear of Current or Ex-Partner: Not on file    Emotionally Abused: Not on file    Physically Abused: Not on file    Sexually Abused: Not on file   Housing Stability:     Unable to Pay for Housing in the Last Year: Not on file    Number of Places Lived in the Last Year: Not on file    Unstable Housing in the Last Year: Not on file         ALLERGIES: Adhesive, Mold, Statins-hmg-coa reductase inhibitors, Welchol [colesevelam], Zetia [ezetimibe], and Anastrozole    Review of Systems   Constitutional: Negative for fever. HENT: Negative for sore throat. Eyes: Negative for visual disturbance. Respiratory: Negative for cough. Cardiovascular: Negative for chest pain. Gastrointestinal: Positive for abdominal pain and diarrhea. Genitourinary: Negative for dysuria. Musculoskeletal: Negative for back pain. Skin: Negative for rash. Neurological: Positive for headaches. Psychiatric/Behavioral: Positive for dysphoric mood. Negative for suicidal ideas. Vitals:    02/08/22 1042   BP: (!) 156/93   Pulse: 97   Resp: 18   Temp: 97.5 °F (36.4 °C)   SpO2: 97%            Physical Exam  Vitals and nursing note reviewed. Constitutional:       General: She is not in acute distress. Appearance: She is well-developed. HENT:      Head: Normocephalic and atraumatic. Eyes:      Conjunctiva/sclera: Conjunctivae normal.   Neck:      Trachea: Phonation normal.   Cardiovascular:      Rate and Rhythm: Normal rate. Pulmonary:      Effort: Pulmonary effort is normal. No respiratory distress. Breath sounds: No wheezing or rales. Abdominal:      General: There is no distension. Tenderness: There is abdominal tenderness in the suprapubic area. There is no guarding or rebound. Musculoskeletal:         General: No tenderness. Normal range of motion. Cervical back: Normal range of motion. Skin:     General: Skin is warm and dry. Neurological:      Mental Status: She is alert. She is not disoriented. Motor: No abnormal muscle tone. [facility]         1:50 PM  Patient evaluated by [facility] and provided outpatient psychiatric resources. Senior services consult pending.   CT head with no acute findings. CT abd/pelvis with distended common bile duct and pancreatic duct and distended gall bladder without definite stones. WBC and LFTs normal.  Added on lipase and RUQ US. Patient and sone updated on results and plan.      3:03 PM  PAtient updated and son on US results with again show distended gallbladder and ducts without stones. Adivsed follow-up with GI to eval for chronic diarrhea and possible MRCP. Will discharge home.      Procedures

## 2022-02-08 NOTE — BSMART NOTE
Comprehensive Assessment Form Part 1      Section I - Disposition    Axis I - Depression, Generalized Anxiety Disorder  Axis II - Deferred  Axis III - Fibromyalgia, High Cholesterol, Environmental Allergies, Thyroid Activity Decreased, GERD, Osteoporosis, DM, Breast Cancer, Stroke, Dementia (all by record)      The Medical Doctor to Psychiatrist conference was completed. The Medical Doctor is in agreement with Psychiatrist disposition because of (reason) N/A. The plan is to discharge with resources. The on-call Psychiatrist consulted was Dr. Adali Hoover. The admitting Psychiatrist will be Dr. Adali Hoover. The admitting Diagnosis is N/A. The Payor source is N/A. This writer reviewed the [address] in nursing flow sheet and the risk level assigned is moderate. Based on this assessment, the risk of suicide is moderate and the plan is to discharge to family with [address] resources. Section II - Integrated Summary  Summary:  Patient is an 44-year-old female, brought to the ER today by her son, with the following complaint:    Triage: Patient reports diarrhea and abdominal pain X 3 weeks. Patient also reports she is in the process of moving in to assisted living and experiencing feelings of sadness. Headache \"banging sensation\" on the back of the head. Patient would like a CT of head/neck. Patient presents as alert, oriented x 4. She reports several medical concerns (as noted above) with increased levels of anxiety and depression. She reports history of anxiety and depression, but that it's become much worse with recent stressors. Specifically, she is in the process of arranging to move into an assisted-living facility, and is upset about the recent marriage of her 75-year-old son. Patient reports having stayed in her current \"big, beautiful\" house too long, delaying an inevitable move to an CON.   She relates this to an aspect of her sons marriage, in that by marrying again she has \"lost\" her son and grandson. Patient states that doesn't like the woman her son , and stated in front of him, \"I was hoping I'd get a real daughter-in-law. \"  She added that she disapproves strongly, because of her profound [place] beliefs, of her son marrying a  woman. Son later reported that mother considers the woman an \"adulterer\" and that the woman and son will now go to [facility]. Patient stated, \"He's old enough to make his own mistakes. \"  After some discussion between mother, son, and Sharon Peters, it was agreed that patient is struggling to cope with change, many aspects of which she can't control, and is lonely. Patient repeatedly brought up medical concerns, despite being reminded several times that writer is not a doctor. Patient reported, when asked, that she has considered suicide, but that she \"doesn't have the nerve to do it,\" and that Taoism beliefs prohibit it. She reports the ideation, again when asked, of \"pills. \"  Patient denies current intent, however, stating \"I just want to focus on getting into my new place ([facility]), then I'll be okay. \"  Patient reports that she's been on a variety of medication over the years, but that Ativan, which used to work well to lessen her anxiety, no longer is effective. She recently was taken off Paxil, started on a new medication, the name of which she can't remember. Son states, however, that patient has not been taking the medication regularly. She currently is seeing a Anabaptist counselor with whom she is happy. Son (Phu Whitt, [phone]) reports that he and patient met with [facility] nurse this morning and that it was a very positive meeting. He states that they hope to accelerate the admission from next week to this week. Patient states that she does not feel she needs inpatient psychiatric treatment today, but that she might be interested were she not in the process of moving, as she doesn't want to disrupt that process.   Son, speaking privately with patient's permission, stated that he feels that patient is lonely and, to some degree, \"attention-seeking. \"  He expressed the opinion that she will be \"fine\" once she gets into the CON because she's a very social person. He added that she is eating poorly and will benefit from regularly eating good food. He states that patient's medication is in [facility] machine\" that metes it out, thus is not available for her to abuse. He states that he sees her daily. Patient and son agree to call [facility], which number was provided and highlighted in provided [facility] resource list.        The patient has demonstrated mental capacity to provide informed consent. The information is given by the patient and son. The Chief Complaint is anxiety and depression. The Precipitant Factors are family stressors, move to [facility]. Previous Hospitalizations: No.  The patient has not previously been in restraints. Current Psychiatrist and/or  is N/A. Lethality Assessment:    The potential for suicide noted by the following: ideation . The potential for homicide is not noted. The patient has not been a perpetrator of sexual or physical abuse. There are not pending charges. The patient is not felt to be at risk for self harm or harm to others. Section III - Psychosocial  The patient's overall mood and attitude is anxious. Feelings of helplessness and hopelessness are not observed. Generalized anxiety is observed by patient report. Panic is not observed. Phobias are not observed. Obsessive compulsive tendencies are not observed. Section IV - Mental Status Exam  The patient's appearance shows no evidence of impairment. The patient's behavior shows no evidence of impairment. The patient is oriented to time, place, person and situation. The patient's speech shows no evidence of impairment. The patient's mood is anxious. The range of affect shows no evidence of impairment.   The patient's thought content demonstrates no evidence of impairment. The thought process shows no evidence of impairment. The patient's perception shows no evidence of impairment. The patient's memory shows no evidence of impairment. The patient's appetite shows no evidence of impairment. The patient's sleep has evidence of insomnia. The patient's insight shows no evidence of impairment. The patient's judgement shows no evidence of impairment. Section V - Substance Abuse  The patient is not using substances. Section VI - Living Arrangements  The patient is . The patient lives alone. The patient has one child age 54. The patient does plan to return home upon discharge. The patient does not have legal issues pending. The patient's source of income comes from social security. [place] and cultural practices have not been voiced at this time. The patient's greatest support comes from son and this person will be involved with the treatment. The patient has not been in an event described as horrible or outside the realm of ordinary life experience either currently or in the past.  The patient has not been a victim of sexual/physical abuse. Section VII - Other Areas of Clinical Concern  The highest grade achieved is unknown with the overall quality of school experience being described as unknown. The patient is currently retired and speaks [place] as a primary language. The patient has no communication impairments affecting communication. The patient's preference for learning can be described as: can read and write adequately. The patient's hearing is normal.  The patient's vision is normal.      Candice Omalley LCSW.

## 2022-02-08 NOTE — ED TRIAGE NOTES
Triage: Patient reports diarrhea and abdominal pain X 3 weeks. Patient also reports she is in the process of moving in to assisted living and experiencing feelings of sadness. Headache \"banging sensation\" on the back of the head. Patient would like a CT of head/neck.

## 2022-02-09 ENCOUNTER — APPOINTMENT (OUTPATIENT)
Dept: ULTRASOUND IMAGING | Age: 87
DRG: 392 | End: 2022-02-09
Attending: STUDENT IN AN ORGANIZED HEALTH CARE EDUCATION/TRAINING PROGRAM
Payer: MEDICARE

## 2022-02-09 ENCOUNTER — HOSPITAL ENCOUNTER (INPATIENT)
Age: 87
LOS: 3 days | Discharge: HOME | DRG: 392 | End: 2022-02-12
Attending: STUDENT IN AN ORGANIZED HEALTH CARE EDUCATION/TRAINING PROGRAM | Admitting: FAMILY MEDICINE
Payer: MEDICARE

## 2022-02-09 ENCOUNTER — APPOINTMENT (OUTPATIENT)
Dept: MRI IMAGING | Age: 87
DRG: 392 | End: 2022-02-09
Attending: NURSE PRACTITIONER
Payer: MEDICARE

## 2022-02-09 DIAGNOSIS — R10.9 INTRACTABLE ABDOMINAL PAIN: Primary | ICD-10-CM

## 2022-02-09 DIAGNOSIS — K83.8 DILATED BILE DUCT: ICD-10-CM

## 2022-02-09 LAB
ALBUMIN SERPL-MCNC: 3.9 G/DL (ref 3.5–5)
ALBUMIN/GLOB SERPL: 1 {RATIO} (ref 1.1–2.2)
ALP SERPL-CCNC: 108 U/L (ref 45–117)
ALT SERPL-CCNC: 28 U/L (ref 12–78)
ANION GAP SERPL CALC-SCNC: 7 MMOL/L (ref 5–15)
AST SERPL-CCNC: 22 U/L (ref 15–37)
BASOPHILS # BLD: 0.1 K/UL (ref 0–0.1)
BASOPHILS NFR BLD: 1 % (ref 0–1)
BILIRUB SERPL-MCNC: 0.4 MG/DL (ref 0.2–1)
BUN SERPL-MCNC: 10 MG/DL (ref 6–20)
BUN/CREAT SERPL: 9 (ref 12–20)
CALCIUM SERPL-MCNC: 9.8 MG/DL (ref 8.5–10.1)
CHLORIDE SERPL-SCNC: 106 MMOL/L (ref 97–108)
CO2 SERPL-SCNC: 24 MMOL/L (ref 21–32)
COMMENT, HOLDF: NORMAL
COVID-19 RAPID TEST, COVR: NOT DETECTED
CREAT SERPL-MCNC: 1.07 MG/DL (ref 0.55–1.02)
DIFFERENTIAL METHOD BLD: NORMAL
EOSINOPHIL # BLD: 0.2 K/UL (ref 0–0.4)
EOSINOPHIL NFR BLD: 3 % (ref 0–7)
ERYTHROCYTE [DISTWIDTH] IN BLOOD BY AUTOMATED COUNT: 13 % (ref 11.5–14.5)
GLOBULIN SER CALC-MCNC: 3.9 G/DL (ref 2–4)
GLUCOSE BLD STRIP.AUTO-MCNC: 122 MG/DL (ref 65–117)
GLUCOSE SERPL-MCNC: 130 MG/DL (ref 65–100)
HCT VFR BLD AUTO: 44.5 % (ref 35–47)
HGB BLD-MCNC: 14.8 G/DL (ref 11.5–16)
IMM GRANULOCYTES # BLD AUTO: 0 K/UL (ref 0–0.04)
IMM GRANULOCYTES NFR BLD AUTO: 0 % (ref 0–0.5)
LIPASE SERPL-CCNC: 116 U/L (ref 73–393)
LYMPHOCYTES # BLD: 1.9 K/UL (ref 0.8–3.5)
LYMPHOCYTES NFR BLD: 29 % (ref 12–49)
MCH RBC QN AUTO: 30.1 PG (ref 26–34)
MCHC RBC AUTO-ENTMCNC: 33.3 G/DL (ref 30–36.5)
MCV RBC AUTO: 90.6 FL (ref 80–99)
MONOCYTES # BLD: 0.7 K/UL (ref 0–1)
MONOCYTES NFR BLD: 12 % (ref 5–13)
NEUTS SEG # BLD: 3.6 K/UL (ref 1.8–8)
NEUTS SEG NFR BLD: 55 % (ref 32–75)
NRBC # BLD: 0 K/UL (ref 0–0.01)
NRBC BLD-RTO: 0 PER 100 WBC
PLATELET # BLD AUTO: 245 K/UL (ref 150–400)
PMV BLD AUTO: 9.4 FL (ref 8.9–12.9)
POTASSIUM SERPL-SCNC: 3.4 MMOL/L (ref 3.5–5.1)
PROT SERPL-MCNC: 7.8 G/DL (ref 6.4–8.2)
RBC # BLD AUTO: 4.91 M/UL (ref 3.8–5.2)
SAMPLES BEING HELD,HOLD: NORMAL
SERVICE CMNT-IMP: ABNORMAL
SODIUM SERPL-SCNC: 137 MMOL/L (ref 136–145)
SOURCE, COVRS: NORMAL
WBC # BLD AUTO: 6.4 K/UL (ref 3.6–11)

## 2022-02-09 PROCEDURE — 96374 THER/PROPH/DIAG INJ IV PUSH: CPT

## 2022-02-09 PROCEDURE — 65270000032 HC RM SEMIPRIVATE

## 2022-02-09 PROCEDURE — 74181 MRI ABDOMEN W/O CONTRAST: CPT

## 2022-02-09 PROCEDURE — 83690 ASSAY OF LIPASE: CPT

## 2022-02-09 PROCEDURE — 36415 COLL VENOUS BLD VENIPUNCTURE: CPT

## 2022-02-09 PROCEDURE — 74011000250 HC RX REV CODE- 250: Performed by: NURSE PRACTITIONER

## 2022-02-09 PROCEDURE — 74011250636 HC RX REV CODE- 250/636: Performed by: FAMILY MEDICINE

## 2022-02-09 PROCEDURE — 82378 CARCINOEMBRYONIC ANTIGEN: CPT

## 2022-02-09 PROCEDURE — 85025 COMPLETE CBC W/AUTO DIFF WBC: CPT

## 2022-02-09 PROCEDURE — 96375 TX/PRO/DX INJ NEW DRUG ADDON: CPT

## 2022-02-09 PROCEDURE — 99285 EMERGENCY DEPT VISIT HI MDM: CPT

## 2022-02-09 PROCEDURE — 80053 COMPREHEN METABOLIC PANEL: CPT

## 2022-02-09 PROCEDURE — 82962 GLUCOSE BLOOD TEST: CPT

## 2022-02-09 PROCEDURE — 96361 HYDRATE IV INFUSION ADD-ON: CPT

## 2022-02-09 PROCEDURE — C9113 INJ PANTOPRAZOLE SODIUM, VIA: HCPCS | Performed by: NURSE PRACTITIONER

## 2022-02-09 PROCEDURE — 74011250637 HC RX REV CODE- 250/637: Performed by: NURSE PRACTITIONER

## 2022-02-09 PROCEDURE — 74011250636 HC RX REV CODE- 250/636: Performed by: STUDENT IN AN ORGANIZED HEALTH CARE EDUCATION/TRAINING PROGRAM

## 2022-02-09 PROCEDURE — 87635 SARS-COV-2 COVID-19 AMP PRB: CPT

## 2022-02-09 PROCEDURE — 74011000250 HC RX REV CODE- 250: Performed by: FAMILY MEDICINE

## 2022-02-09 PROCEDURE — 74011250636 HC RX REV CODE- 250/636: Performed by: NURSE PRACTITIONER

## 2022-02-09 RX ORDER — LACTULOSE 10 G/15ML
10 SOLUTION ORAL; RECTAL 2 TIMES DAILY
Status: DISCONTINUED | OUTPATIENT
Start: 2022-02-09 | End: 2022-02-09

## 2022-02-09 RX ORDER — ONDANSETRON 2 MG/ML
4 INJECTION INTRAMUSCULAR; INTRAVENOUS
Status: DISCONTINUED | OUTPATIENT
Start: 2022-02-09 | End: 2022-02-12 | Stop reason: HOSPADM

## 2022-02-09 RX ORDER — ONDANSETRON 2 MG/ML
4 INJECTION INTRAMUSCULAR; INTRAVENOUS
Status: COMPLETED | OUTPATIENT
Start: 2022-02-09 | End: 2022-02-09

## 2022-02-09 RX ORDER — LEVOTHYROXINE SODIUM 75 UG/1
75 TABLET ORAL
Status: DISCONTINUED | OUTPATIENT
Start: 2022-02-10 | End: 2022-02-12 | Stop reason: HOSPADM

## 2022-02-09 RX ORDER — INSULIN LISPRO 100 [IU]/ML
INJECTION, SOLUTION INTRAVENOUS; SUBCUTANEOUS EVERY 6 HOURS
Status: DISCONTINUED | OUTPATIENT
Start: 2022-02-09 | End: 2022-02-12 | Stop reason: HOSPADM

## 2022-02-09 RX ORDER — SODIUM CHLORIDE 0.9 % (FLUSH) 0.9 %
5-40 SYRINGE (ML) INJECTION AS NEEDED
Status: DISCONTINUED | OUTPATIENT
Start: 2022-02-09 | End: 2022-02-12 | Stop reason: HOSPADM

## 2022-02-09 RX ORDER — SODIUM CHLORIDE 9 MG/ML
75 INJECTION, SOLUTION INTRAVENOUS CONTINUOUS
Status: DISCONTINUED | OUTPATIENT
Start: 2022-02-09 | End: 2022-02-11

## 2022-02-09 RX ORDER — SERTRALINE HYDROCHLORIDE 50 MG/1
50 TABLET, FILM COATED ORAL DAILY
Status: ON HOLD | COMMUNITY
End: 2022-02-10

## 2022-02-09 RX ORDER — KETOROLAC TROMETHAMINE 30 MG/ML
15 INJECTION, SOLUTION INTRAMUSCULAR; INTRAVENOUS
Status: COMPLETED | OUTPATIENT
Start: 2022-02-09 | End: 2022-02-09

## 2022-02-09 RX ORDER — SODIUM CHLORIDE 0.9 % (FLUSH) 0.9 %
5-40 SYRINGE (ML) INJECTION EVERY 8 HOURS
Status: DISCONTINUED | OUTPATIENT
Start: 2022-02-09 | End: 2022-02-12 | Stop reason: HOSPADM

## 2022-02-09 RX ORDER — MAGNESIUM SULFATE 100 %
4 CRYSTALS MISCELLANEOUS AS NEEDED
Status: DISCONTINUED | OUTPATIENT
Start: 2022-02-09 | End: 2022-02-12 | Stop reason: HOSPADM

## 2022-02-09 RX ORDER — SODIUM CHLORIDE 9 MG/ML
75 INJECTION, SOLUTION INTRAVENOUS CONTINUOUS
Status: DISCONTINUED | OUTPATIENT
Start: 2022-02-09 | End: 2022-02-10

## 2022-02-09 RX ORDER — PANTOPRAZOLE SODIUM 40 MG/10ML
40 INJECTION, POWDER, LYOPHILIZED, FOR SOLUTION INTRAVENOUS DAILY
Status: DISCONTINUED | OUTPATIENT
Start: 2022-02-09 | End: 2022-02-12 | Stop reason: HOSPADM

## 2022-02-09 RX ORDER — BUPROPION HYDROCHLORIDE 150 MG/1
150 TABLET, EXTENDED RELEASE ORAL 2 TIMES DAILY
Status: DISCONTINUED | OUTPATIENT
Start: 2022-02-09 | End: 2022-02-12 | Stop reason: HOSPADM

## 2022-02-09 RX ORDER — AMOXICILLIN 250 MG
2 CAPSULE ORAL
COMMUNITY

## 2022-02-09 RX ORDER — SODIUM CHLORIDE 0.9 % (FLUSH) 0.9 %
10 SYRINGE (ML) INJECTION DAILY
Status: DISCONTINUED | OUTPATIENT
Start: 2022-02-09 | End: 2022-02-12 | Stop reason: HOSPADM

## 2022-02-09 RX ORDER — POTASSIUM CHLORIDE 14.9 MG/ML
10 INJECTION INTRAVENOUS ONCE
Status: COMPLETED | OUTPATIENT
Start: 2022-02-09 | End: 2022-02-09

## 2022-02-09 RX ORDER — TRAZODONE HYDROCHLORIDE 50 MG/1
50 TABLET ORAL
COMMUNITY

## 2022-02-09 RX ORDER — TRAMADOL HYDROCHLORIDE 50 MG/1
50 TABLET ORAL
Status: DISCONTINUED | OUTPATIENT
Start: 2022-02-09 | End: 2022-02-12 | Stop reason: HOSPADM

## 2022-02-09 RX ORDER — HYDRALAZINE HYDROCHLORIDE 20 MG/ML
10 INJECTION INTRAMUSCULAR; INTRAVENOUS
Status: DISCONTINUED | OUTPATIENT
Start: 2022-02-09 | End: 2022-02-12 | Stop reason: HOSPADM

## 2022-02-09 RX ORDER — HYDROMORPHONE HYDROCHLORIDE 1 MG/ML
0.5 INJECTION, SOLUTION INTRAMUSCULAR; INTRAVENOUS; SUBCUTANEOUS
Status: COMPLETED | OUTPATIENT
Start: 2022-02-09 | End: 2022-02-09

## 2022-02-09 RX ORDER — LORAZEPAM 1 MG/1
1 TABLET ORAL
Status: DISCONTINUED | OUTPATIENT
Start: 2022-02-09 | End: 2022-02-12 | Stop reason: HOSPADM

## 2022-02-09 RX ORDER — MORPHINE SULFATE 2 MG/ML
1 INJECTION, SOLUTION INTRAMUSCULAR; INTRAVENOUS
Status: DISCONTINUED | OUTPATIENT
Start: 2022-02-09 | End: 2022-02-12 | Stop reason: HOSPADM

## 2022-02-09 RX ORDER — LANOLIN ALCOHOL/MO/W.PET/CERES
500 CREAM (GRAM) TOPICAL
COMMUNITY

## 2022-02-09 RX ORDER — TRAZODONE HYDROCHLORIDE 50 MG/1
50 TABLET ORAL
Status: DISCONTINUED | OUTPATIENT
Start: 2022-02-09 | End: 2022-02-12 | Stop reason: HOSPADM

## 2022-02-09 RX ADMIN — SODIUM CHLORIDE 1000 ML: 9 INJECTION, SOLUTION INTRAVENOUS at 13:28

## 2022-02-09 RX ADMIN — SODIUM CHLORIDE, PRESERVATIVE FREE 10 ML: 5 INJECTION INTRAVENOUS at 20:58

## 2022-02-09 RX ADMIN — TRAZODONE HYDROCHLORIDE 50 MG: 50 TABLET ORAL at 22:44

## 2022-02-09 RX ADMIN — PANTOPRAZOLE SODIUM 40 MG: 40 INJECTION, POWDER, FOR SOLUTION INTRAVENOUS at 15:28

## 2022-02-09 RX ADMIN — POTASSIUM CHLORIDE 10 MEQ: 14.9 INJECTION, SOLUTION INTRAVENOUS at 17:09

## 2022-02-09 RX ADMIN — ONDANSETRON HYDROCHLORIDE 4 MG: 2 SOLUTION INTRAMUSCULAR; INTRAVENOUS at 13:27

## 2022-02-09 RX ADMIN — SODIUM CHLORIDE, PRESERVATIVE FREE 10 ML: 5 INJECTION INTRAVENOUS at 15:28

## 2022-02-09 RX ADMIN — KETOROLAC TROMETHAMINE 15 MG: 30 INJECTION, SOLUTION INTRAMUSCULAR; INTRAVENOUS at 13:27

## 2022-02-09 RX ADMIN — MORPHINE SULFATE 1 MG: 2 INJECTION, SOLUTION INTRAMUSCULAR; INTRAVENOUS at 20:57

## 2022-02-09 RX ADMIN — ONDANSETRON HYDROCHLORIDE 4 MG: 2 SOLUTION INTRAMUSCULAR; INTRAVENOUS at 22:44

## 2022-02-09 RX ADMIN — TRAMADOL HYDROCHLORIDE 50 MG: 50 TABLET, COATED ORAL at 22:45

## 2022-02-09 RX ADMIN — LORAZEPAM 1 MG: 1 TABLET ORAL at 22:44

## 2022-02-09 RX ADMIN — HYDROMORPHONE HYDROCHLORIDE 0.5 MG: 1 INJECTION, SOLUTION INTRAMUSCULAR; INTRAVENOUS; SUBCUTANEOUS at 15:29

## 2022-02-09 NOTE — ED NOTES
Pt reports abdominal pain with accompanying diarrhea x6 weeks. Pt reports its worse when she eats. Pt was seen here yesterday and was told to follow up and when she called they couldn't see her for 2 weeks so she came back today.

## 2022-02-09 NOTE — PROGRESS NOTES
Admission Medication Reconciliation:    Information obtained from:  Patient  RxQuery data available¹:  YES    Comments/Recommendations: Updated PTA meds/reviewed patient's allergies. 1)  Spoke with patient regarding her home medications and patient was mostly familiar with what she takes. Could not confirm doses for all medications. States her son helps manage her meds at home. 2)  Medication changes (since last review): Added  - sertraline 50mg daily    Adjusted  - Allegra changed to PRN    Removed  - niacin  - lactulose    3)  Of note, patient believes she has still been taking senna-docusate. She described the pill to me and states she thought it was for cholesterol and not constipation. Given one of her complaints is diarrhea over the past ~month, this was contributing if she was in fact taking it. 4) Wellbutrin is a new medication she has only been taking for about a week (in addition to sertraline). She endorses taking more lorazepam the past few days than how it was prescribed because she has been feeling very anxious over the fact that she has not been feeling well. ¹RxQuery pharmacy benefit data reflects medications filled and processed through the patient's insurance, however   this data does NOT capture whether the medication was picked up or is currently being taken by the patient.     Allergies:  Adhesive, Mold, Statins-hmg-coa reductase inhibitors, Welchol [colesevelam], Zetia [ezetimibe], and Anastrozole    Significant PMH/Disease States:   Past Medical History:   Diagnosis Date    Breast [facility] ([facility] [address]     LEFT BREAST -- Lumpectomy ([facility])    Dementia ([facility] [address] 4/12/2021    DM (diabetes mellitus) ([facility] [address]     Environmental allergies     Fibromyalgia     GERD (gastroesophageal reflux disease)     High cholesterol     Osteoporosis     Psychiatric disorder     hx depression    Stroke [facility])     Seen incidentally    Thyroid activity decreased     goiter     Chief Complaint for this Admission: Chief Complaint   Patient presents with    Abdominal Pain    Diarrhea     Prior to Admission Medications:   Prior to Admission Medications   Prescriptions Last Dose Informant Taking? LORazepam (ATIVAN) 1 mg tablet  Self Yes   Sig: Take 1 Tab by mouth three (3) times daily as needed for Anxiety. Max Daily Amount: 3 tablets   acetaminophen (Tylenol Extra Strength) 500 mg tablet  Self Yes   Si every 8 hours   buPROPion SR (WELLBUTRIN SR) 150 mg SR tablet 2022 Self Yes   Sig: Take 1 Tablet by mouth two (2) times a day. Second dose before 5pm   celecoxib (CELEBREX) 200 mg capsule  Self Yes   Sig: TAKE ONE CAPSULE BY MOUTH EVERY DAY   fexofenadine (ALLEGRA) 180 mg tablet  Self Yes   Sig: Take 180 mg by mouth daily as needed for Allergies. levothyroxine (SYNTHROID) 75 mcg tablet  Self Yes   Sig: TAKE ONE TABLET BY MOUTH EVERY DAY   omeprazole (PRILOSEC) 20 mg capsule  Self Yes   Sig: TAKE ONE CAPSULE BY MOUTH AT BEDTIME   senna-docusate (PERICOLACE) 8.6-50 mg per tablet  Self Yes   Sig: Take 2 Tablets by mouth daily as needed for Constipation. sertraline (ZOLOFT) 50 mg tablet  Self Yes   Sig: Take 50 mg by mouth daily. traMADoL (ULTRAM) 50 mg tablet  Self Yes   Sig: Take 1 Tablet by mouth three (3) times daily as needed for Pain for up to 90 days. Max Daily Amount: 150 mg. Facility-Administered Medications: None     Please contact the main inpatient pharmacy with any questions or concerns at [phone] and we will direct you to the clinical pharmacist covering this patient's care while in-house.      Trena Frey, BLAINED

## 2022-02-09 NOTE — PROGRESS NOTES
T.O.C.    RUR: 8% Low  Disposition: Home pending medical stability. Family is moving her into [facility] next week. Follow up with PCP  Transport: Armando Marcial, [phone]    Reason for Admission:  Intractable abdominal pain                   RUR Score:    8% Low           Plan for utilizing home health:     none     PCP:    First and Last name:  Dr. Mariaelena Mustafa                 Name of Practice:                Are you a current patient: Yes/No: yes              Approximate date of last visit: within the past week              Can you participate in a virtual visit with your PCP:                     Current Advanced Directive/Advance Care Plan: P.O. A. Armando Marcial, [phone], he is also the primary decision maker. Secondary is [employer], who is her current part time caretaker. CM spoke with son on the phone to introduce self and role. Living situation: Currently lives alone in a one story, single family home. Family is helping her move into [facility] next week. ADLs: independent  DME: cane, walker  Previous IPR/SNF: Yes, [facility]  Previous home health: Yes, [facility] and Independent caregivers through their [facility]  Demographics: confirmed and address updated  Pharmacy: [facility]  Main point of contact: Armando Marcial, [phone]    CM to follow patient progress and assist as recommended with MAGGIE plan. Care Management Interventions  PCP Verified by CM: Yes  Palliative Care Criteria Met (RRAT>21 & CHF Dx)?: No  Mode of Transport at Discharge:  Other (see comment) (Son)  MyChart Signup: No  Discharge Durable Medical Equipment: No  Health Maintenance Reviewed: Yes  Physical Therapy Consult: No  Occupational Therapy Consult: No  Speech Therapy Consult: No  Support Systems: Child(guicho),Caregiver/Home Care Staff  Confirm Follow Up Transport: Family  The Plan for Transition of Care is Related to the Following Treatment Goals : Home pending medical stability. Discharge Location  Patient Expects to be Discharged to[de-identified] Home (Is moving into [facility] next week)    Medicare pt has received, reviewed, and signed 1st IM letter informing them of their right to appeal the discharge. Signed copy has been placed on pt bedside chart.     Pearl De Dios, Master's of Social Work Supervisee

## 2022-02-09 NOTE — H&P
[address] [facility] Adult  Hospitalist Group  History and Physical    Date of Service:  2/9/2022  Primary Care Provider: Larisa Downing MD  Source of information: The patient    Chief Complaint: Abdominal Pain and Diarrhea      History of Presenting Illness:   Jac Bowman is a 80 y.o. female who presents with abd pain    History was primarily obtained from the patient, patient reports she has been not doing so well for the past many weeks, patient reports she has been having nausea, vomiting, abdominal pain, diarrhea associated with her symptoms, patient reports that symptoms got worse today, got concerned and decided to come to the hospital, patient had a CT of the abdomen pelvis which showed dilated CBD, concern for choledocholithiasis and patient was admitted to the hospital service         REVIEW OF SYSTEMS:  A comprehensive review of systems was negative except for that written in the History of Present Illness. Past Medical History:   Diagnosis Date    Breast cancer ([facility]     LEFT BREAST -- Lumpectomy ([facility])    Dementia ([facility] 4/12/2021    DM (diabetes mellitus) ([facility]     Environmental allergies     Fibromyalgia     GERD (gastroesophageal reflux disease)     High cholesterol     Osteoporosis     Psychiatric disorder     hx depression    Stroke [facility])     Seen incidentally    Thyroid activity decreased     goiter      Past Surgical History:   Procedure Laterality Date    ENDOSCOPY, COLON, DIAGNOSTIC  6/2013    Neg. No f/u needed. Melody Robbins).  HX APPENDECTOMY      HX HEMORRHOIDECTOMY      HX KNEE REPLACEMENT Right 01/2016    HX TONSILLECTOMY      NC BREAST SURGERY PROCEDURE UNLISTED  6/2014    left breast lumpectomy, PARTIAL MASTECTOMY    NC SPINE SURGERY PROCEDURE UNLISTED  1/10    L spine; LAMINECTOMY, FUSION     Prior to Admission medications    Medication Sig Start Date End Date Taking?  Authorizing Provider   levothyroxine (SYNTHROID) 75 mcg tablet TAKE ONE TABLET BY MOUTH EVERY DAY 1/31/22   Padilla Moreno MD   buPROPion SR [facility] SR) 150 mg SR tablet Take 1 Tablet by mouth two (2) times a day. Second dose before 5pm 1/31/22   Padilla Moreno MD   traMADoL Susan Patel) 50 mg tablet Take 1 Tablet by mouth three (3) times daily as needed for Pain for up to 90 days. Max Daily Amount: 150 mg. 1/28/22 4/28/22  Padilla Moreno MD   niacin ER (NIASPAN) 500 mg tablet TAKE ONE TABLET BY MOUTH NIGHTLY 1/1/22   Padilla Moreno MD   celecoxib (CELEBREX) 200 mg capsule TAKE ONE CAPSULE BY MOUTH EVERY DAY 12/12/21   Padilla Moreno MD   LORazepam (ATIVAN) 1 mg tablet Take 1 Tab by mouth three (3) times daily as needed for Anxiety. Max Daily Amount: 3 tablets 11/4/21   Padilla Moreno MD   omeprazole (PRILOSEC) 20 mg capsule TAKE ONE CAPSULE BY MOUTH AT BEDTIME 9/19/21   Padilla Moreno MD   fexofenadine (ALLEGRA) 180 mg tablet Take 1 Tab by mouth daily. 5/7/21   Padilla Moreno MD   acetaminophen (Tylenol Extra Strength) 500 mg tablet 2 every 8 hours 2/5/21   Ness Avina, DO   senna-docusate (PERICOLACE) 8.6-50 mg per tablet Take 2 Tabs by mouth daily. Patient taking differently: Take 2 Tabs by mouth daily as needed. 2/5/21   Becki Pete, DO   lactulose (CHRONULAC) 10 gram/15 mL solution Take 15 mL by mouth two (2) times a day. Patient taking differently: Take 10 g by mouth two (2) times daily as needed (constipation).  2/5/21   Becki Pete, DO     Allergies   Allergen Reactions    Adhesive Rash    Mold Other (comments)     GRASS, WEEDS, DUST: CONGESTION    Statins-Hmg-Coa Reductase Inhibitors Myalgia    Welchol [Colesevelam] Myalgia    Zetia [Ezetimibe] Myalgia    Anastrozole Rash      Family History   Problem Relation Age of Onset    Heart Disease Mother     Cancer Father         pancreatic    Heart Disease Brother 46        CAD    Hypertension Brother     Stroke Brother     Heart Disease Maternal Grandfather     Heart Disease Brother     Hypertension Brother     Stroke Brother     Heart Disease Brother     Hypertension Brother     Cancer Brother         liver    Heart Disease Brother     Cancer Brother         lung    Heart Surgery Brother     Hypertension Brother     Hypertension Sister    Mary Romine Cancer Sister         colon    Cancer Sister         lung    Anesth Problems Neg Hx       Social History:  reports that she quit smoking about 57 years ago. She smoked 0.00 packs per day. She has never used smokeless tobacco. She reports that she does not drink alcohol and does not use drugs. Family and social history were personally reviewed, all pertinent and relevant details are outlined as above. Objective:     Visit Vitals  BP (!) 157/136   Pulse 99   Temp 98.3 °F (36.8 °C)   Resp 19   SpO2 100%      O2 Device: None (Room air)    PHYSICAL EXAM:   General: Alert and awake, no acute distress  HEENT: PEERL, EOMI, moist mucus membranes  Neck: Supple, no JVD, no meningeal signs  Chest: Clear to auscultation bilaterally   CVS: RRR, S1 S2 heard, no murmurs/rubs/gallops  Abd: Soft, mildly tender in the epigastric region/right upper quadrant, no rebound, no guarding  Ext: No clubbing, no cyanosis, no edema  Neuro/Psych: Pleasant mood and affect, no focal neurological deficit      Data Review: All diagnostic labs and studies have been reviewed.     Abnormal Labs Reviewed   METABOLIC PANEL, COMPREHENSIVE - Abnormal; Notable for the following components:       Result Value    Potassium 3.4 (*)     Glucose 130 (*)     Creatinine 1.07 (*)     BUN/Creatinine ratio 9 (*)     GFR est AA 59 (*)     GFR est non-AA 48 (*)     A-G Ratio 1.0 (*)     All other components within normal limits       All Micro Results     Procedure Component Value Units Date/Time    COVID-19 RAPID TEST [[id number]]     Order Status: Sent           IMAGING:   US ABD LTD    (Results Pending)   MRI ABD W MRCP WO CONT    (Results Pending)        ECG/ECHO:    Results for orders placed or performed during the hospital encounter of 12/28/15   EKG, 12 LEAD, INITIAL   Result Value Ref Range    Ventricular Rate 84 BPM    Atrial Rate 84 BPM    P-R Interval 154 ms    QRS Duration 96 ms    Q-T Interval 370 ms    QTC Calculation (Bezet) 437 ms    Calculated P Axis 45 degrees    Calculated R Axis -30 degrees    Calculated T Axis 34 degrees    Diagnosis       Normal sinus rhythm  Left axis deviation  Abnormal ECG  When compared with ECG of 06-JAN-2010 15:43,  No significant change was found  Confirmed by Maribel Spear MD, Jayy john ([id number]) on 12/28/2015 7:38:39 PM          Assessment:   Given the patient's current clinical presentation, there is a high level of concern for decompensation if discharged from the emergency department. Complex decision making was performed, which includes reviewing the patient's available past medical records, laboratory results, and imaging studies. Abdominal pain  Diabetes mellitus type 2  Hyperlipidemia  Plan:   Patient with choledocholithiasis versus cholecystitis moderate etiology, clear liquid diet. , empirical antibiotics, IV fluids, antiemetic, MRCP, appreciate GI consult, continue to monitor, may need ERCP  Sliding-scale NovoLog insulin, Accu-Cheks, diet control, close monitoring  Hold statin          DIET: ADULT DIET Clear Liquid  DIET NPO   ISOLATION PRECAUTIONS: There are currently no Active Isolations  CODE STATUS: Prior   DVT PROPHYLAXIS: Heparin  FUNCTIONAL STATUS PRIOR TO HOSPITALIZATION: Fully active and ambulatory; able to carry on all self-care without restriction. EARLY MOBILITY ASSESSMENT: Recommend routine ambulation while hospitalized with the assistance of nursing staff  ANTICIPATED DISCHARGE: Greater than 48 hours. Signed By: Marlen Edmondson MD     February 9, 2022         Please note that this dictation may have been completed with Dragon, the [employer] voice recognition software.   Quite often unanticipated grammatical, syntax, homophones, and other interpretive errors are inadvertently transcribed by the computer software. Please disregard these errors. Please excuse any errors that have escaped final proofreading.

## 2022-02-09 NOTE — ED PROVIDER NOTES
The history is provided by the patient. Abdominal Pain   This is a recurrent problem. The current episode started more than 1 week ago. The problem occurs daily. The problem has been gradually worsening. The pain is associated with eating. The pain is located in the generalized abdominal region. The quality of the pain is aching and dull. The pain is moderate. Associated symptoms include diarrhea and headaches. Pertinent negatives include no fever, no nausea, no vomiting, no dysuria, no chest pain and no back pain. The pain is worsened by eating. The pain is relieved by nothing. Past workup includes CT scan, ultrasound. Past workup comments: in ED yesterday; referred to GI who cannot see them until February 21, 2022, therefore decided to come to the ED today for further management. . The patient's surgical history includes appendectomy. Diarrhea   This is a chronic problem. The current episode started more than 1 week ago. The problem occurs daily. The problem has been gradually worsening. The pain is associated with eating. The pain is located in the generalized abdominal region. Associated symptoms include diarrhea and headaches. Pertinent negatives include no fever, no nausea, no vomiting, no dysuria, no chest pain and no back pain. The pain is worsened by eating. The pain is relieved by nothing. Past workup includes CT scan, ultrasound. Past workup comments: in ED yesterday; referred to GI who cannot see them until February 21, 2022, therefore decided to come to the ED today for further management. . The patient's surgical history includes appendectomy.        Past Medical History:   Diagnosis Date    Breast cancer ([address]     LEFT BREAST -- Lumpectomy ([facility])    Dementia ([address] 4/12/2021    DM (diabetes mellitus) ([address]     Environmental allergies     Fibromyalgia     GERD (gastroesophageal reflux disease)     High cholesterol     Osteoporosis     Psychiatric disorder     hx depression    Stroke [facility])     Seen incidentally    Thyroid activity decreased     goiter       Past Surgical History:   Procedure Laterality Date    ENDOSCOPY, COLON, DIAGNOSTIC  2013    Neg. No f/u needed. Abdias Yeung).     HX APPENDECTOMY      HX HEMORRHOIDECTOMY      HX KNEE REPLACEMENT Right 2016    HX TONSILLECTOMY      CT BREAST SURGERY PROCEDURE UNLISTED  2014    left breast lumpectomy, PARTIAL MASTECTOMY    CT SPINE SURGERY PROCEDURE UNLISTED  1/10    L spine; LAMINECTOMY, FUSION         Family History:   Problem Relation Age of Onset    Heart Disease Mother     Cancer Father         pancreatic    Heart Disease Brother 46        CAD    Hypertension Brother     Stroke Brother     Heart Disease Maternal Grandfather     Heart Disease Brother     Hypertension Brother     Stroke Brother     Heart Disease Brother     Hypertension Brother     Cancer Brother         liver    Heart Disease Brother     Cancer Brother         lung    Heart Surgery Brother     Hypertension Brother     Hypertension Sister    [employer] Cancer Sister         colon    Cancer Sister         lung    Anesth Problems Neg Hx        Social History     Socioeconomic History    Marital status:      Spouse name: Not on file    Number of children: Not on file    Years of education: Not on file    Highest education level: Not on file   Occupational History    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, 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   Tobacco Use    Smoking status: Former Smoker     Packs/day: 0.00     Quit date: 1964     Years since quittin.4    Smokeless tobacco: Never Used   Substance and Sexual Activity    Alcohol use: No    Drug use: No    Sexual activity: Not on file   Other Topics Concern    Not on file   Social History Narrative    Lives in [facility] alone     Social Determinants of Health     Financial Resource Strain:     Difficulty of Paying Living Expenses: Not on file   Food Insecurity:     Worried About Running Out of Food in the Last Year: Not on file    Ran Out of Food in the Last Year: Not on file   Transportation Needs:     Lack of Transportation (Medical): Not on file    Lack of Transportation (Non-Medical): Not on file   Physical Activity:     Days of Exercise per Week: Not on file    Minutes of Exercise per Session: Not on file   Stress:     Feeling of Stress : Not on file   Social Connections:     Frequency of Communication with Friends and Family: Not on file    Frequency of Social Gatherings with Friends and Family: Not on file    Attends [employer] Services: Not on file    Active Member of [address] [place] [address] [employer] or Organizations: Not on file    Attends Club or Organization Meetings: Not on file    Marital Status: Not on file   Intimate Partner Violence:     Fear of Current or Ex-Partner: Not on file    Emotionally Abused: Not on file    Physically Abused: Not on file    Sexually Abused: Not on file   Housing Stability:     Unable to Pay for Housing in the Last Year: Not on file    Number of [place] in the Last Year: Not on file    Unstable Housing in the Last Year: Not on file         ALLERGIES: Adhesive, Mold, Statins-hmg-coa reductase inhibitors, Welchol [colesevelam], Zetia [ezetimibe], and Anastrozole    Review of Systems   Constitutional: Negative for fever. Respiratory: Negative for cough and shortness of breath. Cardiovascular: Negative for chest pain. Gastrointestinal: Positive for abdominal pain and diarrhea. Negative for blood in stool, nausea and vomiting. See HPI   Genitourinary: Negative for dysuria. Musculoskeletal: Negative for back pain. Skin: Negative for rash. Neurological: Positive for headaches. Negative for syncope. Psychiatric/Behavioral: Positive for agitation. Negative for confusion. All other systems reviewed and are negative.       Vitals:    02/09/22 1208   BP: (!) 190/117   Pulse: (!) 108   Resp: 18   Temp: 98 °F (36.7 °C)   SpO2: 97% Physical Exam  Vitals and nursing note reviewed. Constitutional:       General: She is not in acute distress. Appearance: She is well-developed. HENT:      Head: Normocephalic and atraumatic. Eyes:      Conjunctiva/sclera: Conjunctivae normal.   Cardiovascular:      Rate and Rhythm: Normal rate and regular rhythm. Pulmonary:      Effort: Pulmonary effort is normal. No respiratory distress. Abdominal:      Palpations: Abdomen is soft. Tenderness: There is generalized abdominal tenderness (mild). There is no guarding or rebound. Negative signs include Garcia's sign. Musculoskeletal:         General: Normal range of motion. Cervical back: Normal range of motion and neck supple. Skin:     General: Skin is warm and dry. Neurological:      Mental Status: She is alert and oriented to person, place, and time. Motor: No abnormal muscle tone. MDM       Procedures    Perfect Serve Consult for Admission  3:20 PM    ED Room Number: ER27/27  Patient Name and age:  Chula Conklin 80 y.o.  female  Working Diagnosis:   1. Intractable abdominal pain    2. Dilated bile duct        COVID-19 Suspicion:  no  Sepsis present:  no  Reassessment needed: no  Code Status:  Full Code  Readmission: no  Isolation Requirements:  no  Recommended Level of Care:  med/surg  Department:[facility] Adult ED - 21   Other:  GI consulted. Plan for EGD tomorrow.

## 2022-02-09 NOTE — PROCEDURES
[address]  [address]                 NAME:  Rebekah Juarez   :   1932   MRN:   [id number]     Date/Time:  2022 2:28 PM    Esophagogastroduodenoscopy (EGD) Procedure Note    :  Virgie Zurita MD    Staff: * Surgery not found *     Referring Provider:  Obdulio Dye MD    Anethesia/Sedation:  MAC anesthesia Propofol    Preoperative diagnosis: Vomiting    Postoperative diagnosis: Hiatal hernia, esophageal ring    Procedure Details     After infom consent was obtained for the procedure, with all risks and benefits of procedure explained the patient was taken to the endoscopy suite and placed in the left lateral decubitus position. Following sequential administration of sedation as per above, the [id number] gastroscope was inserted into the mouth and advanced under direct vision to second portion of the duodenum. A careful inspection was made as the gastroscope was withdrawn, including a retroflexed view of the proximal stomach; findings and interventions are described below. Findings:  Esophagus:Distal esophageal ring dilated with 15-18 mm balloon sequentially. 4-5 cm large hiatal hernia appeared to be above fundoplication. Moderate amount of food seen lodged in the hernial sac. Random biopsies done. Stomach:Status post fundoplication  Duodenum/jejunum:normal      Therapies:  As above    Specimens: esophageal bx           EBL: None    Complications:   None; patient tolerated the procedure well. Impression:    See Postoperative diagnosis above    Recommendations:  -Await pathology. , -Mechanical soft diet. OK to DC from GI standpoint.  FU with Dr. Micheline Daniels in 1-2 months        Virgie Zurita MD

## 2022-02-09 NOTE — ED NOTES
TRANSFER - OUT REPORT:    Verbal report given to So(name) on [employer]  being transferred to [facility](unit) for routine progression of care       Report consisted of patients Situation, Background, Assessment and   Recommendations(SBAR). Information from the following report(s) SBAR, Kardex and ED Summary was reviewed with the receiving nurse. Lines:   Peripheral IV 02/09/22 Left Arm (Active)   Site Assessment Clean, dry, & intact 02/09/22 1247        Opportunity for questions and clarification was provided.

## 2022-02-09 NOTE — PROGRESS NOTES
Transfer In:    Patient off unit to MRI at this time. Has not arrived to floor. Verbal report received from Zina Gautam (name) on Cephus Mood  being received from ED (unit) for routine progression of care      Report consisted of patients Situation, Background, Assessment and   Recommendations(SBAR). Information from the following report(s) SBAR, Kardex, ED Summary, Intake/Output, MAR, Recent Results and Cardiac Rhythm NSR was reviewed with the receiving nurse. Opportunity for questions and clarification was provided. Oncoming nurse, Joseph Bocanegra RN to assume care when patient arrives to unit.  RN updated with SBAR report

## 2022-02-09 NOTE — CONSULTS
[address], [facility] Michael Vigil  [phone]        Thank you for requesting this consultation but this patient has been seen by [facility] in the past.  We will inform them of this request.    Sincerely,  SUSI Amaro

## 2022-02-09 NOTE — CONSULTS
Heron Becker, [facility]  [phone] office  [phone] voicemail   Gastroenterology Consultation Note      Admit Date: 2/9/2022  Consult Date: 2/9/2022   I greatly appreciate your asking me to see Diana Orellana, thank you very much for the opportunity to participate in her care. Narrative Assessment and Plan   · Abdominal pain, nausea, diarrhea for 1 month with abnormal findings of the biliary tree and pancreas, though liver enzymes and lipase are normal  · Diarrhea presumed non-infectious due to recent negative stool studies    Plan:  MRCP for further evaluation including microlithiasis, choledocholithiasis, mass  Trend LFTs, lipase  Check CEA, CA 19-9  PPI daily  Tentative plan for EGD tomorrow to evaluate for peptic causes of pain including ulcer  May eventually need EUS  Check fecal elastase given pancreatic atrophy    Subjective:     Chief Complaint: abdominal pain, nausea, diarrhea    History of Present Illness: 79 yo F with history of arthritis, GERD, fibromyalgia and new diagnosis of dementia reports at least 4 weeks of generalize non-radiating abdominal pain as well as severe nausea but no vomiting. Nausea is worse after eating. She has GERD and takes Prilosec daily. She also reports new diarrhea which is typically post-prandial, approximately 4-5 times per day. She denies any hematochezia but does report black stool after a medication though she cannot tell me which one. Denies steatorrhea. She reports lack of appetite and thinks she has lost about 20lb. History of smoking, no ETOH. Family history of both colon CA and pancreatic CA.      PCP:  Emily Davila MD    Past Medical History:   Diagnosis Date    Breast cancer [facility])     LEFT BREAST -- Lumpectomy ([place])    Dementia ([facility] [address] 4/12/2021    DM (diabetes mellitus) ([facility] [address]     Environmental allergies     Fibromyalgia     GERD (gastroesophageal reflux disease)     High cholesterol     Osteoporosis     Psychiatric disorder     hx depression    Stroke [facility])     Seen incidentally    Thyroid activity decreased     goiter        Past Surgical History:   Procedure Laterality Date    ENDOSCOPY, COLON, DIAGNOSTIC  2013    Neg. No f/u needed. Felicity Adler.  HX APPENDECTOMY      HX HEMORRHOIDECTOMY      HX KNEE REPLACEMENT Right 2016    HX TONSILLECTOMY      [place] BREAST SURGERY PROCEDURE UNLISTED  2014    left breast lumpectomy, PARTIAL MASTECTOMY    [place] SPINE SURGERY PROCEDURE UNLISTED  1/10    L spine; LAMINECTOMY, FUSION       Social History     Tobacco Use    Smoking status: Former Smoker     Packs/day: 0.00     Quit date: 1964     Years since quittin.4    Smokeless tobacco: Never Used   Substance Use Topics    Alcohol use: No        Family History   Problem Relation Age of Onset    Heart Disease Mother     Cancer Father         pancreatic    Heart Disease Brother 46        CAD    Hypertension Brother     Stroke Brother     Heart Disease Maternal Grandfather     Heart Disease Brother     Hypertension Brother     Stroke Brother     Heart Disease Brother     Hypertension Brother     Cancer Brother         liver    Heart Disease Brother     Cancer Brother         lung    Heart Surgery Brother     Hypertension Brother     Hypertension Sister     Cancer Sister         colon    Cancer Sister         lung    Anesth Problems Neg Hx         Allergies   Allergen Reactions    Adhesive Rash    Mold Other (comments)     GRASS, WEEDS, DUST: CONGESTION    Statins-Hmg-Coa Reductase Inhibitors Myalgia    Welchol [Colesevelam] Myalgia    Zetia [Ezetimibe] Myalgia    Anastrozole Rash            Home Medications:  Prior to Admission Medications   Prescriptions Last Dose Informant Patient Reported? Taking? LORazepam (ATIVAN) 1 mg tablet   No No   Sig: Take 1 Tab by mouth three (3) times daily as needed for Anxiety.  Max Daily Amount: 3 tablets   acetaminophen (Tylenol Extra Strength) 500 mg tablet   No No   Si every 8 hours   buPROPion SR (WELLBUTRIN SR) 150 mg SR tablet   No No   Sig: Take 1 Tablet by mouth two (2) times a day. Second dose before 5pm   celecoxib (CELEBREX) 200 mg capsule   No No   Sig: TAKE ONE CAPSULE BY MOUTH EVERY DAY   fexofenadine (ALLEGRA) 180 mg tablet   Yes No   Sig: Take 1 Tab by mouth daily. lactulose (CHRONULAC) 10 gram/15 mL solution   No No   Sig: Take 15 mL by mouth two (2) times a day. Patient taking differently: Take 10 g by mouth two (2) times daily as needed (constipation). levothyroxine (SYNTHROID) 75 mcg tablet   No No   Sig: TAKE ONE TABLET BY MOUTH EVERY DAY   niacin ER (NIASPAN) 500 mg tablet   No No   Sig: TAKE ONE TABLET BY MOUTH NIGHTLY   omeprazole (PRILOSEC) 20 mg capsule   No No   Sig: TAKE ONE CAPSULE BY MOUTH AT BEDTIME   senna-docusate (PERICOLACE) 8.6-50 mg per tablet   No No   Sig: Take 2 Tabs by mouth daily. Patient taking differently: Take 2 Tabs by mouth daily as needed. traMADoL (ULTRAM) 50 mg tablet   No No   Sig: Take 1 Tablet by mouth three (3) times daily as needed for Pain for up to 90 days. Max Daily Amount: 150 mg. Facility-Administered Medications: None       Hospital Medications:  No current facility-administered medications for this encounter. Current Outpatient Medications   Medication Sig    levothyroxine (SYNTHROID) 75 mcg tablet TAKE ONE TABLET BY MOUTH EVERY DAY    buPROPion SR (WELLBUTRIN SR) 150 mg SR tablet Take 1 Tablet by mouth two (2) times a day. Second dose before 5pm    traMADoL (ULTRAM) 50 mg tablet Take 1 Tablet by mouth three (3) times daily as needed for Pain for up to 90 days. Max Daily Amount: 150 mg.    niacin ER (NIASPAN) 500 mg tablet TAKE ONE TABLET BY MOUTH NIGHTLY    celecoxib (CELEBREX) 200 mg capsule TAKE ONE CAPSULE BY MOUTH EVERY DAY    LORazepam (ATIVAN) 1 mg tablet Take 1 Tab by mouth three (3) times daily as needed for Anxiety.  Max Daily Amount: 3 tablets    omeprazole (PRILOSEC) 20 mg capsule TAKE ONE CAPSULE BY MOUTH AT BEDTIME    fexofenadine (ALLEGRA) 180 mg tablet Take 1 Tab by mouth daily.  acetaminophen (Tylenol Extra Strength) 500 mg tablet 2 every 8 hours    senna-docusate (PERICOLACE) 8.6-50 mg per tablet Take 2 Tabs by mouth daily. (Patient taking differently: Take 2 Tabs by mouth daily as needed.)    lactulose (CHRONULAC) 10 gram/15 mL solution Take 15 mL by mouth two (2) times a day. (Patient taking differently: Take 10 g by mouth two (2) times daily as needed (constipation). )       Review of Systems: Admission ROS by No admitting provider for patient encounter.  from 2/9/2022 were reviewed with the patient and changes (other than per HPI) include: none      Objective:     Physical Exam:  Visit Vitals  BP (!) 150/121   Pulse (!) 101   Temp 98.3 °F (36.8 °C)   Resp 12   SpO2 98%     SpO2 Readings from Last 6 Encounters:   02/09/22 98%   02/08/22 97%   04/15/21 98%   02/23/21 99%   02/05/21 93%   01/08/21 98%        No intake or output data in the 24 hours ending 02/09/22 1440     General: anxious, trembling  Skin:  No rash or palpable dermatologic mass lesions  HEENT: Pupils equal, sclera anicteric, oropharynx with no gross lesions  Cardiovascular: No abnormal audible heart sounds, well perfused, no edema  Respiratory:  No abnormal audible breath sounds, normal respiratory effort, no throacic deformity  GI:  Tender in the upper quadrants, active BS, mild distension  Psychiatric:  anxious      Laboratory:    Recent Results (from the past 24 hour(s))   CBC WITH AUTOMATED DIFF    Collection Time: 02/09/22 12:46 PM   Result Value Ref Range    WBC 6.4 3.6 - 11.0 K/uL    RBC 4.91 3.80 - 5.20 M/uL    HGB 14.8 11.5 - 16.0 g/dL    HCT 44.5 35.0 - 47.0 %    MCV 90.6 80.0 - 99.0 FL    MCH 30.1 26.0 - 34.0 PG    MCHC 33.3 30.0 - 36.5 g/dL    RDW 13.0 11.5 - 14.5 %    PLATELET 985 741 - 685 K/uL    MPV 9.4 8.9 - 12.9 FL    NRBC 0.0 0  WBC ABSOLUTE NRBC 0.00 0.00 - 0.01 K/uL    NEUTROPHILS 55 32 - 75 %    LYMPHOCYTES 29 12 - 49 %    MONOCYTES 12 5 - 13 %    EOSINOPHILS 3 0 - 7 %    BASOPHILS 1 0 - 1 %    IMMATURE GRANULOCYTES 0 0.0 - 0.5 %    ABS. NEUTROPHILS 3.6 1.8 - 8.0 K/UL    ABS. LYMPHOCYTES 1.9 0.8 - 3.5 K/UL    ABS. MONOCYTES 0.7 0.0 - 1.0 K/UL    ABS. EOSINOPHILS 0.2 0.0 - 0.4 K/UL    ABS. BASOPHILS 0.1 0.0 - 0.1 K/UL    ABS. IMM. GRANS. 0.0 0.00 - 0.04 K/UL    DF AUTOMATED     METABOLIC PANEL, COMPREHENSIVE    Collection Time: 02/09/22 12:46 PM   Result Value Ref Range    Sodium 137 136 - 145 mmol/L    Potassium 3.4 (L) 3.5 - 5.1 mmol/L    Chloride 106 97 - 108 mmol/L    CO2 24 21 - 32 mmol/L    Anion gap 7 5 - 15 mmol/L    Glucose 130 (H) 65 - 100 mg/dL    BUN 10 6 - 20 MG/DL    Creatinine 1.07 (H) 0.55 - 1.02 MG/DL    BUN/Creatinine ratio 9 (L) 12 - 20      GFR est AA 59 (L) >60 ml/min/1.73m2    GFR est non-AA 48 (L) >60 ml/min/1.73m2    Calcium 9.8 8.5 - 10.1 MG/DL    Bilirubin, total 0.4 0.2 - 1.0 MG/DL    ALT (SGPT) 28 12 - 78 U/L    AST (SGOT) 22 15 - 37 U/L    Alk. phosphatase 108 45 - 117 U/L    Protein, total 7.8 6.4 - 8.2 g/dL    Albumin 3.9 3.5 - 5.0 g/dL    Globulin 3.9 2.0 - 4.0 g/dL    A-G Ratio 1.0 (L) 1.1 - 2.2     SAMPLES BEING HELD    Collection Time: 02/09/22 12:46 PM   Result Value Ref Range    SAMPLES BEING HELD 1RED     COMMENT        Add-on orders for these samples will be processed based on acceptable specimen integrity and analyte stability, which may vary by analyte. LIPASE    Collection Time: 02/09/22 12:46 PM   Result Value Ref Range    Lipase 116 73 - 393 U/L         Assessment/Plan:     Active Problems:    * No active hospital problems. *       See above narrative for full detail.

## 2022-02-10 ENCOUNTER — ANESTHESIA EVENT (OUTPATIENT)
Dept: ENDOSCOPY | Age: 87
DRG: 392 | End: 2022-02-10
Payer: MEDICARE

## 2022-02-10 ENCOUNTER — ANESTHESIA (OUTPATIENT)
Dept: ENDOSCOPY | Age: 87
DRG: 392 | End: 2022-02-10
Payer: MEDICARE

## 2022-02-10 LAB
ALBUMIN SERPL-MCNC: 3.6 G/DL (ref 3.5–5)
ALBUMIN/GLOB SERPL: 1.1 {RATIO} (ref 1.1–2.2)
ALP SERPL-CCNC: 99 U/L (ref 45–117)
ALT SERPL-CCNC: 24 U/L (ref 12–78)
ANION GAP SERPL CALC-SCNC: 8 MMOL/L (ref 5–15)
AST SERPL-CCNC: 17 U/L (ref 15–37)
BASOPHILS # BLD: 0.1 K/UL (ref 0–0.1)
BASOPHILS NFR BLD: 2 % (ref 0–1)
BILIRUB SERPL-MCNC: 0.5 MG/DL (ref 0.2–1)
BUN SERPL-MCNC: 9 MG/DL (ref 6–20)
BUN/CREAT SERPL: 10 (ref 12–20)
CALCIUM SERPL-MCNC: 9 MG/DL (ref 8.5–10.1)
CEA SERPL-MCNC: 0.9 NG/ML
CHLORIDE SERPL-SCNC: 109 MMOL/L (ref 97–108)
CO2 SERPL-SCNC: 21 MMOL/L (ref 21–32)
CREAT SERPL-MCNC: 0.94 MG/DL (ref 0.55–1.02)
DIFFERENTIAL METHOD BLD: ABNORMAL
EOSINOPHIL # BLD: 0.3 K/UL (ref 0–0.4)
EOSINOPHIL NFR BLD: 4 % (ref 0–7)
ERYTHROCYTE [DISTWIDTH] IN BLOOD BY AUTOMATED COUNT: 13.1 % (ref 11.5–14.5)
GLOBULIN SER CALC-MCNC: 3.4 G/DL (ref 2–4)
GLUCOSE BLD STRIP.AUTO-MCNC: 111 MG/DL (ref 65–117)
GLUCOSE BLD STRIP.AUTO-MCNC: 118 MG/DL (ref 65–117)
GLUCOSE BLD STRIP.AUTO-MCNC: 133 MG/DL (ref 65–117)
GLUCOSE SERPL-MCNC: 120 MG/DL (ref 65–100)
HCT VFR BLD AUTO: 42 % (ref 35–47)
HGB BLD-MCNC: 13.8 G/DL (ref 11.5–16)
IMM GRANULOCYTES # BLD AUTO: 0 K/UL (ref 0–0.04)
IMM GRANULOCYTES NFR BLD AUTO: 0 % (ref 0–0.5)
LYMPHOCYTES # BLD: 1.7 K/UL (ref 0.8–3.5)
LYMPHOCYTES NFR BLD: 28 % (ref 12–49)
MCH RBC QN AUTO: 30.4 PG (ref 26–34)
MCHC RBC AUTO-ENTMCNC: 32.9 G/DL (ref 30–36.5)
MCV RBC AUTO: 92.5 FL (ref 80–99)
MONOCYTES # BLD: 0.8 K/UL (ref 0–1)
MONOCYTES NFR BLD: 14 % (ref 5–13)
NEUTS SEG # BLD: 3.1 K/UL (ref 1.8–8)
NEUTS SEG NFR BLD: 52 % (ref 32–75)
NRBC # BLD: 0 K/UL (ref 0–0.01)
NRBC BLD-RTO: 0 PER 100 WBC
PLATELET # BLD AUTO: 240 K/UL (ref 150–400)
PMV BLD AUTO: 9.7 FL (ref 8.9–12.9)
POTASSIUM SERPL-SCNC: 3.4 MMOL/L (ref 3.5–5.1)
PROT SERPL-MCNC: 7 G/DL (ref 6.4–8.2)
RBC # BLD AUTO: 4.54 M/UL (ref 3.8–5.2)
SERVICE CMNT-IMP: ABNORMAL
SERVICE CMNT-IMP: ABNORMAL
SERVICE CMNT-IMP: NORMAL
SODIUM SERPL-SCNC: 138 MMOL/L (ref 136–145)
WBC # BLD AUTO: 5.9 K/UL (ref 3.6–11)

## 2022-02-10 PROCEDURE — 65270000032 HC RM SEMIPRIVATE

## 2022-02-10 PROCEDURE — 74011250636 HC RX REV CODE- 250/636: Performed by: FAMILY MEDICINE

## 2022-02-10 PROCEDURE — 74011250636 HC RX REV CODE- 250/636: Performed by: NURSE ANESTHETIST, CERTIFIED REGISTERED

## 2022-02-10 PROCEDURE — 88305 TISSUE EXAM BY PATHOLOGIST: CPT

## 2022-02-10 PROCEDURE — 74011000250 HC RX REV CODE- 250: Performed by: NURSE PRACTITIONER

## 2022-02-10 PROCEDURE — 76060000031 HC ANESTHESIA FIRST 0.5 HR: Performed by: SPECIALIST

## 2022-02-10 PROCEDURE — 74011250637 HC RX REV CODE- 250/637: Performed by: NURSE PRACTITIONER

## 2022-02-10 PROCEDURE — 74011250637 HC RX REV CODE- 250/637: Performed by: FAMILY MEDICINE

## 2022-02-10 PROCEDURE — 36415 COLL VENOUS BLD VENIPUNCTURE: CPT

## 2022-02-10 PROCEDURE — 74011000250 HC RX REV CODE- 250: Performed by: NURSE ANESTHETIST, CERTIFIED REGISTERED

## 2022-02-10 PROCEDURE — 74011000258 HC RX REV CODE- 258: Performed by: FAMILY MEDICINE

## 2022-02-10 PROCEDURE — 82962 GLUCOSE BLOOD TEST: CPT

## 2022-02-10 PROCEDURE — C9113 INJ PANTOPRAZOLE SODIUM, VIA: HCPCS | Performed by: NURSE PRACTITIONER

## 2022-02-10 PROCEDURE — 2709999900 HC NON-CHARGEABLE SUPPLY: Performed by: SPECIALIST

## 2022-02-10 PROCEDURE — 80053 COMPREHEN METABOLIC PANEL: CPT

## 2022-02-10 PROCEDURE — 74011250636 HC RX REV CODE- 250/636: Performed by: NURSE PRACTITIONER

## 2022-02-10 PROCEDURE — 0DB58ZX EXCISION OF ESOPHAGUS, VIA NATURAL OR ARTIFICIAL OPENING ENDOSCOPIC, DIAGNOSTIC: ICD-10-PCS | Performed by: SPECIALIST

## 2022-02-10 PROCEDURE — 0DB68ZZ EXCISION OF STOMACH, VIA NATURAL OR ARTIFICIAL OPENING ENDOSCOPIC: ICD-10-PCS | Performed by: SPECIALIST

## 2022-02-10 PROCEDURE — 85025 COMPLETE CBC W/AUTO DIFF WBC: CPT

## 2022-02-10 PROCEDURE — 76040000019: Performed by: SPECIALIST

## 2022-02-10 RX ORDER — SIMETHICONE 80 MG
80 TABLET,CHEWABLE ORAL
Status: DISCONTINUED | OUTPATIENT
Start: 2022-02-10 | End: 2022-02-12 | Stop reason: HOSPADM

## 2022-02-10 RX ORDER — DEXTROMETHORPHAN/PSEUDOEPHED 2.5-7.5/.8
1.2 DROPS ORAL
Status: DISCONTINUED | OUTPATIENT
Start: 2022-02-10 | End: 2022-02-10 | Stop reason: HOSPADM

## 2022-02-10 RX ORDER — LIDOCAINE HYDROCHLORIDE 20 MG/ML
INJECTION, SOLUTION EPIDURAL; INFILTRATION; INTRACAUDAL; PERINEURAL AS NEEDED
Status: DISCONTINUED | OUTPATIENT
Start: 2022-02-10 | End: 2022-02-10 | Stop reason: HOSPADM

## 2022-02-10 RX ORDER — SODIUM CHLORIDE 0.9 % (FLUSH) 0.9 %
5-40 SYRINGE (ML) INJECTION EVERY 8 HOURS
Status: DISCONTINUED | OUTPATIENT
Start: 2022-02-10 | End: 2022-02-12 | Stop reason: HOSPADM

## 2022-02-10 RX ORDER — SODIUM CHLORIDE 9 MG/ML
INJECTION, SOLUTION INTRAVENOUS
Status: DISCONTINUED | OUTPATIENT
Start: 2022-02-10 | End: 2022-02-10 | Stop reason: HOSPADM

## 2022-02-10 RX ORDER — SODIUM CHLORIDE 0.9 % (FLUSH) 0.9 %
5-40 SYRINGE (ML) INJECTION AS NEEDED
Status: DISCONTINUED | OUTPATIENT
Start: 2022-02-10 | End: 2022-02-12 | Stop reason: HOSPADM

## 2022-02-10 RX ORDER — EPINEPHRINE 0.1 MG/ML
1 INJECTION INTRACARDIAC; INTRAVENOUS
Status: DISCONTINUED | OUTPATIENT
Start: 2022-02-10 | End: 2022-02-10 | Stop reason: HOSPADM

## 2022-02-10 RX ORDER — SODIUM CHLORIDE 9 MG/ML
50 INJECTION, SOLUTION INTRAVENOUS CONTINUOUS
Status: DISPENSED | OUTPATIENT
Start: 2022-02-10 | End: 2022-02-10

## 2022-02-10 RX ORDER — FLUMAZENIL 0.1 MG/ML
0.2 INJECTION INTRAVENOUS
Status: DISCONTINUED | OUTPATIENT
Start: 2022-02-10 | End: 2022-02-10 | Stop reason: HOSPADM

## 2022-02-10 RX ORDER — NALOXONE HYDROCHLORIDE 0.4 MG/ML
0.4 INJECTION, SOLUTION INTRAMUSCULAR; INTRAVENOUS; SUBCUTANEOUS
Status: DISCONTINUED | OUTPATIENT
Start: 2022-02-10 | End: 2022-02-10 | Stop reason: HOSPADM

## 2022-02-10 RX ORDER — PROPOFOL 10 MG/ML
INJECTION, EMULSION INTRAVENOUS AS NEEDED
Status: DISCONTINUED | OUTPATIENT
Start: 2022-02-10 | End: 2022-02-10 | Stop reason: HOSPADM

## 2022-02-10 RX ORDER — ATROPINE SULFATE 0.1 MG/ML
0.5 INJECTION INTRAVENOUS
Status: DISCONTINUED | OUTPATIENT
Start: 2022-02-10 | End: 2022-02-10 | Stop reason: HOSPADM

## 2022-02-10 RX ADMIN — PROPOFOL 25 MG: 10 INJECTION, EMULSION INTRAVENOUS at 14:10

## 2022-02-10 RX ADMIN — SODIUM CHLORIDE, PRESERVATIVE FREE 20 MG: 5 INJECTION INTRAVENOUS at 01:42

## 2022-02-10 RX ADMIN — ONDANSETRON HYDROCHLORIDE 4 MG: 2 SOLUTION INTRAMUSCULAR; INTRAVENOUS at 11:19

## 2022-02-10 RX ADMIN — PROPOFOL 25 MG: 10 INJECTION, EMULSION INTRAVENOUS at 14:12

## 2022-02-10 RX ADMIN — TRAZODONE HYDROCHLORIDE 50 MG: 50 TABLET ORAL at 20:03

## 2022-02-10 RX ADMIN — ONDANSETRON HYDROCHLORIDE 4 MG: 2 SOLUTION INTRAMUSCULAR; INTRAVENOUS at 20:03

## 2022-02-10 RX ADMIN — PROPOFOL 50 MG: 10 INJECTION, EMULSION INTRAVENOUS at 14:09

## 2022-02-10 RX ADMIN — PIPERACILLIN AND TAZOBACTAM 3.38 G: 3; .375 INJECTION, POWDER, LYOPHILIZED, FOR SOLUTION INTRAVENOUS at 11:19

## 2022-02-10 RX ADMIN — SODIUM CHLORIDE, PRESERVATIVE FREE 10 ML: 5 INJECTION INTRAVENOUS at 08:48

## 2022-02-10 RX ADMIN — PROPOFOL 25 MG: 10 INJECTION, EMULSION INTRAVENOUS at 14:15

## 2022-02-10 RX ADMIN — BUPROPION HYDROCHLORIDE 150 MG: 150 TABLET, EXTENDED RELEASE ORAL at 08:47

## 2022-02-10 RX ADMIN — LORAZEPAM 1 MG: 1 TABLET ORAL at 18:58

## 2022-02-10 RX ADMIN — HYDRALAZINE HYDROCHLORIDE 10 MG: 20 INJECTION INTRAMUSCULAR; INTRAVENOUS at 01:33

## 2022-02-10 RX ADMIN — LIDOCAINE HYDROCHLORIDE 60 MG: 20 INJECTION, SOLUTION EPIDURAL; INFILTRATION; INTRACAUDAL; PERINEURAL at 14:09

## 2022-02-10 RX ADMIN — SODIUM CHLORIDE 75 ML/HR: 9 INJECTION, SOLUTION INTRAVENOUS at 11:20

## 2022-02-10 RX ADMIN — SIMETHICONE 80 MG: 80 TABLET, CHEWABLE ORAL at 18:58

## 2022-02-10 RX ADMIN — TRAMADOL HYDROCHLORIDE 50 MG: 50 TABLET, COATED ORAL at 04:52

## 2022-02-10 RX ADMIN — PIPERACILLIN AND TAZOBACTAM 3.38 G: 3; .375 INJECTION, POWDER, LYOPHILIZED, FOR SOLUTION INTRAVENOUS at 01:33

## 2022-02-10 RX ADMIN — SODIUM CHLORIDE: 900 INJECTION, SOLUTION INTRAVENOUS at 13:54

## 2022-02-10 RX ADMIN — TRAMADOL HYDROCHLORIDE 50 MG: 50 TABLET, COATED ORAL at 18:58

## 2022-02-10 RX ADMIN — PIPERACILLIN AND TAZOBACTAM 3.38 G: 3; .375 INJECTION, POWDER, LYOPHILIZED, FOR SOLUTION INTRAVENOUS at 18:58

## 2022-02-10 RX ADMIN — PANTOPRAZOLE SODIUM 40 MG: 40 INJECTION, POWDER, FOR SOLUTION INTRAVENOUS at 08:47

## 2022-02-10 RX ADMIN — LORAZEPAM 1 MG: 1 TABLET ORAL at 08:47

## 2022-02-10 RX ADMIN — ONDANSETRON HYDROCHLORIDE 4 MG: 2 SOLUTION INTRAMUSCULAR; INTRAVENOUS at 04:52

## 2022-02-10 RX ADMIN — SIMETHICONE 80 MG: 80 TABLET, CHEWABLE ORAL at 04:52

## 2022-02-10 RX ADMIN — BUPROPION HYDROCHLORIDE 150 MG: 150 TABLET, EXTENDED RELEASE ORAL at 18:58

## 2022-02-10 NOTE — ANESTHESIA POSTPROCEDURE EVALUATION
Post-Anesthesia Evaluation and Assessment    Patient: Yemi Navarrete MRN: [id number]  SSN: [id number]    YOB: 1932  Age: 80 y.o. Sex: female      I have evaluated the patient and they are stable and ready for discharge from the PACU. Cardiovascular Function/Vital Signs  Visit Vitals  BP (!) 155/80   Pulse 72   Temp 37.2 °C (98.9 °F)   Resp 21   Wt 66.2 kg (145 lb 15.1 oz)   SpO2 95%   Breastfeeding No   BMI 25.05 kg/m²       Patient is status post MAC anesthesia for Procedure(s):  ESOPHAGOGASTRODUODENOSCOPY (EGD). Nausea/Vomiting: None    Postoperative hydration reviewed and adequate. Pain:  Pain Scale 1: Numeric (0 - 10) (02/10/22 1440)  Pain Intensity 1: 5 (02/10/22 1440)   Managed    Neurological Status: At baseline    Mental Status, Level of Consciousness: Alert and  oriented to person, place, and time    Pulmonary Status:   O2 Device: None (Room air) (02/10/22 1440)   Adequate oxygenation and airway patent    Complications related to anesthesia: None    Post-anesthesia assessment completed. No concerns    Signed By: Km Bennett MD     February 10, 2022              Procedure(s):  ESOPHAGOGASTRODUODENOSCOPY (EGD). MAC    <BSHSIANPOST>    INITIAL Post-op Vital signs:   Vitals Value Taken Time   /80 02/10/22 1440   Temp 37.2 °C (98.9 °F) 02/10/22 1429   Pulse 79 02/10/22 1443   Resp 16 02/10/22 1443   SpO2 97 % 02/10/22 1443   Vitals shown include unvalidated device data.

## 2022-02-10 NOTE — ANESTHESIA PREPROCEDURE EVALUATION
Relevant Problems   No relevant active problems       Anesthetic History   No history of anesthetic complications            Review of Systems / Medical History  Patient summary reviewed, nursing notes reviewed and pertinent labs reviewed    Pulmonary  Within defined limits                 Neuro/Psych       CVA  TIA and psychiatric history     Cardiovascular                  Exercise tolerance: >4 METS     GI/Hepatic/Renal     GERD           Endo/Other    Diabetes  Hypothyroidism  Arthritis     Other Findings   Comments: dilated CBD           Physical Exam    Airway  Mallampati: I  TM Distance: > 6 cm  Neck ROM: normal range of motion   Mouth opening: Normal     Cardiovascular    Rhythm: regular           Dental  No notable dental hx       Pulmonary  Breath sounds clear to auscultation               Abdominal         Other Findings            Anesthetic Plan    ASA: 3  Anesthesia type: MAC          Induction: Intravenous  Anesthetic plan and risks discussed with: Patient

## 2022-02-10 NOTE — PROGRESS NOTES
TRANSFER - IN REPORT:    Verbal report received from MEERA GARNER(name) on [employer]  being received from 547(unit) for ordered procedure      Report consisted of patients Situation, Background, Assessment and   Recommendations(SBAR). Information from the following report(s) SBAR was reviewed with the receiving nurse. Opportunity for questions and clarification was provided. Assessment completed upon patients arrival to unit and care assumed.

## 2022-02-10 NOTE — PROCEDURES
[address]  [address], [facility]                 NAME:  Roxane Mckeon   :   1932   MRN:   [id number]     Date/Time:  2/10/2022 2:31 PM    Esophagogastroduodenoscopy (EGD) Procedure Note    :  Stephani Boland MD    Staff: Endoscopy Technician-1: Sherlyn Brunenr  Endoscopy RN-1: Cece López RN     Referring Provider:  Morris Briones MD    Anethesia/Sedation:  MAC anesthesia Propofol    Preoperative diagnosis: Upper abdominal pain    Postoperative diagnosis: gastritis    Procedure Details     After infom consent was obtained for the procedure, with all risks and benefits of procedure explained the patient was taken to the endoscopy suite and placed in the left lateral decubitus position. Following sequential administration of sedation as per above, the [id number] gastroscope was inserted into the mouth and advanced under direct vision to second portion of the duodenum. A careful inspection was made as the gastroscope was withdrawn, including a retroflexed view of the proximal stomach; findings and interventions are described below. Findings:  Esophagus:patchy erythema distal esophagus, biopsies done  Stomach:Patchy erythema and congestion in antrum, biopsies done  Duodenum/jejunum:normal      Therapies:  none    Specimens: antral, distal esophagus bx           EBL: None    Complications:   None; patient tolerated the procedure well. Impression:    See Postoperative diagnosis above    Recommendations:  -Acid suppression with a proton pump inhibitor. , -Await pathology. , -mechanical soft diet        Stephani Boland MD

## 2022-02-10 NOTE — PROGRESS NOTES
Gurpreet Rashid  6/9/1932  [id number]    Situation:  Verbal report received from: Adelina procedural RN  Procedure: Procedure(s):  ESOPHAGOGASTRODUODENOSCOPY (EGD)    Background:    Preoperative diagnosis: Upper abdominal pain  Postoperative diagnosis: gastritis    :  Dr. Jairo Johns  Assistant(s): Endoscopy Technician-1: Eleanore Cowden  Endoscopy RN-1: Rosamaria Maier RN    Specimens:   ID Type Source Tests Collected by Time Destination   1 : pathology Preservative Gastric  Henrry Velazquez MD 2/10/2022 1413 Pathology   2 : pathology Preservative Esophagus, Distal  Henrry Velazquez MD 2/10/2022 1415 Pathology     H. Pylori  no      Anesthesia gave intra-procedure sedation and medications, see anesthesia flow sheet yes    Intravenous fluids: NS@ KVO     Vital signs stable     Abdominal assessment: round and soft     Recommendation:  Return to floor    1445 TRANSFER - OUT REPORT:    Verbal report given to Sofi Pitts unit RN on [facility]  being transferred to [facility] for routine progression of care       Report consisted of patients Situation, Background, Assessment and   Recommendations(SBAR). Information from the following report(s) SBAR, Procedure Summary and MAR was reviewed with the receiving nurse. Lines:   Peripheral IV 02/09/22 Left Arm (Active)   Site Assessment Clean, dry, & intact 02/10/22 0214   Phlebitis Assessment 0 02/10/22 0214   Infiltration Assessment 0 02/10/22 0214   Dressing Status Clean, dry, & intact 02/10/22 0214   Dressing Type Tape;Transparent 02/10/22 0214   Hub Color/Line Status Pink; Infusing 02/10/22 0214   Action Taken Open ports on tubing capped 02/10/22 0214   Alcohol Cap Used Yes 02/10/22 0214        Opportunity for questions and clarification was provided. Patient transported with:   transport on stretcher back to unit; VSS; pt awake, alert and oriented.

## 2022-02-11 ENCOUNTER — APPOINTMENT (OUTPATIENT)
Dept: CT IMAGING | Age: 87
DRG: 392 | End: 2022-02-11
Attending: NURSE PRACTITIONER
Payer: MEDICARE

## 2022-02-11 LAB
EST. AVERAGE GLUCOSE BLD GHB EST-MCNC: 143 MG/DL
GLUCOSE BLD STRIP.AUTO-MCNC: 100 MG/DL (ref 65–117)
GLUCOSE BLD STRIP.AUTO-MCNC: 78 MG/DL (ref 65–117)
GLUCOSE BLD STRIP.AUTO-MCNC: 83 MG/DL (ref 65–117)
GLUCOSE BLD STRIP.AUTO-MCNC: 92 MG/DL (ref 65–117)
GLUCOSE BLD STRIP.AUTO-MCNC: 94 MG/DL (ref 65–117)
HBA1C MFR BLD: 6.6 % (ref 4–5.6)
SERVICE CMNT-IMP: NORMAL

## 2022-02-11 PROCEDURE — 74011250636 HC RX REV CODE- 250/636: Performed by: FAMILY MEDICINE

## 2022-02-11 PROCEDURE — 74011250636 HC RX REV CODE- 250/636: Performed by: NURSE PRACTITIONER

## 2022-02-11 PROCEDURE — 74011250637 HC RX REV CODE- 250/637: Performed by: FAMILY MEDICINE

## 2022-02-11 PROCEDURE — C9113 INJ PANTOPRAZOLE SODIUM, VIA: HCPCS | Performed by: NURSE PRACTITIONER

## 2022-02-11 PROCEDURE — 74011000250 HC RX REV CODE- 250: Performed by: FAMILY MEDICINE

## 2022-02-11 PROCEDURE — 82962 GLUCOSE BLOOD TEST: CPT

## 2022-02-11 PROCEDURE — 97165 OT EVAL LOW COMPLEX 30 MIN: CPT

## 2022-02-11 PROCEDURE — 97161 PT EVAL LOW COMPLEX 20 MIN: CPT

## 2022-02-11 PROCEDURE — 74011000258 HC RX REV CODE- 258: Performed by: FAMILY MEDICINE

## 2022-02-11 PROCEDURE — 36415 COLL VENOUS BLD VENIPUNCTURE: CPT

## 2022-02-11 PROCEDURE — 97535 SELF CARE MNGMENT TRAINING: CPT

## 2022-02-11 PROCEDURE — 74011000636 HC RX REV CODE- 636: Performed by: FAMILY MEDICINE

## 2022-02-11 PROCEDURE — 74011250637 HC RX REV CODE- 250/637: Performed by: NURSE PRACTITIONER

## 2022-02-11 PROCEDURE — 74177 CT ABD & PELVIS W/CONTRAST: CPT

## 2022-02-11 PROCEDURE — 74011000250 HC RX REV CODE- 250: Performed by: NURSE PRACTITIONER

## 2022-02-11 PROCEDURE — 83036 HEMOGLOBIN GLYCOSYLATED A1C: CPT

## 2022-02-11 PROCEDURE — 65270000032 HC RM SEMIPRIVATE

## 2022-02-11 PROCEDURE — 97116 GAIT TRAINING THERAPY: CPT

## 2022-02-11 PROCEDURE — 86301 IMMUNOASSAY TUMOR CA 19-9: CPT

## 2022-02-11 PROCEDURE — 74011000250 HC RX REV CODE- 250: Performed by: SPECIALIST

## 2022-02-11 RX ORDER — LOPERAMIDE HYDROCHLORIDE 2 MG/1
4 CAPSULE ORAL DAILY
Status: DISCONTINUED | OUTPATIENT
Start: 2022-02-11 | End: 2022-02-12 | Stop reason: HOSPADM

## 2022-02-11 RX ORDER — CELECOXIB 200 MG/1
200 CAPSULE ORAL DAILY
Status: DISCONTINUED | OUTPATIENT
Start: 2022-02-11 | End: 2022-02-12 | Stop reason: HOSPADM

## 2022-02-11 RX ORDER — DICYCLOMINE HYDROCHLORIDE 10 MG/1
10 CAPSULE ORAL 3 TIMES DAILY
Status: DISCONTINUED | OUTPATIENT
Start: 2022-02-11 | End: 2022-02-12 | Stop reason: HOSPADM

## 2022-02-11 RX ADMIN — ONDANSETRON HYDROCHLORIDE 4 MG: 2 SOLUTION INTRAMUSCULAR; INTRAVENOUS at 00:21

## 2022-02-11 RX ADMIN — DICYCLOMINE HYDROCHLORIDE 10 MG: 10 CAPSULE ORAL at 09:26

## 2022-02-11 RX ADMIN — IOPAMIDOL 100 ML: 755 INJECTION, SOLUTION INTRAVENOUS at 19:08

## 2022-02-11 RX ADMIN — SODIUM CHLORIDE, PRESERVATIVE FREE 10 ML: 5 INJECTION INTRAVENOUS at 09:33

## 2022-02-11 RX ADMIN — BUPROPION HYDROCHLORIDE 150 MG: 150 TABLET, EXTENDED RELEASE ORAL at 09:27

## 2022-02-11 RX ADMIN — LOPERAMIDE HYDROCHLORIDE 4 MG: 2 CAPSULE ORAL at 09:26

## 2022-02-11 RX ADMIN — TRAMADOL HYDROCHLORIDE 50 MG: 50 TABLET, COATED ORAL at 00:21

## 2022-02-11 RX ADMIN — DICYCLOMINE HYDROCHLORIDE 10 MG: 10 CAPSULE ORAL at 21:41

## 2022-02-11 RX ADMIN — BUPROPION HYDROCHLORIDE 150 MG: 150 TABLET, EXTENDED RELEASE ORAL at 20:01

## 2022-02-11 RX ADMIN — ONDANSETRON HYDROCHLORIDE 4 MG: 2 SOLUTION INTRAMUSCULAR; INTRAVENOUS at 09:27

## 2022-02-11 RX ADMIN — LORAZEPAM 1 MG: 1 TABLET ORAL at 09:26

## 2022-02-11 RX ADMIN — TRAZODONE HYDROCHLORIDE 50 MG: 50 TABLET ORAL at 21:41

## 2022-02-11 RX ADMIN — CELECOXIB 200 MG: 200 CAPSULE ORAL at 09:27

## 2022-02-11 RX ADMIN — TRAMADOL HYDROCHLORIDE 50 MG: 50 TABLET, COATED ORAL at 21:47

## 2022-02-11 RX ADMIN — SODIUM CHLORIDE, PRESERVATIVE FREE 10 ML: 5 INJECTION INTRAVENOUS at 22:00

## 2022-02-11 RX ADMIN — SIMETHICONE 80 MG: 80 TABLET, CHEWABLE ORAL at 00:21

## 2022-02-11 RX ADMIN — PIPERACILLIN AND TAZOBACTAM 3.38 G: 3; .375 INJECTION, POWDER, LYOPHILIZED, FOR SOLUTION INTRAVENOUS at 01:12

## 2022-02-11 RX ADMIN — LEVOTHYROXINE SODIUM 75 MCG: 0.07 TABLET ORAL at 09:26

## 2022-02-11 RX ADMIN — PANTOPRAZOLE SODIUM 40 MG: 40 INJECTION, POWDER, FOR SOLUTION INTRAVENOUS at 09:27

## 2022-02-11 RX ADMIN — LORAZEPAM 1 MG: 1 TABLET ORAL at 21:47

## 2022-02-11 RX ADMIN — DICYCLOMINE HYDROCHLORIDE 10 MG: 10 CAPSULE ORAL at 20:01

## 2022-02-11 NOTE — PROGRESS NOTES
OCCUPATIONAL THERAPY EVALUATION/DISCHARGE  Patient: Maria Ines Arciniega (49 y.o. female)  Date: 2/11/2022  Primary Diagnosis: Abdominal pain [R10.9]  Procedure(s) (LRB):  ESOPHAGOGASTRODUODENOSCOPY (EGD) (N/A) 1 Day Post-Op   Precautions: falls       ASSESSMENT  Based on the objective data described below, the patient presents with near baseline ADL performance s/p admission for abdominal pain. At baseline, patient lives alone with plan to move to [facility] within the next week. Patient BUE ROM, strength, coordination WFL. Patient balance intact using RW. Patient with good insight into deficits. Patient completed functional mobility with supervision and toileting IND. Patient stood at sink to was hands IND. Patient left in chair with call bell in reach, MD bedside. Patient with no further acute OT needs, will sign off. Current Level of Function (ADLs/self-care): supervision-IND    Functional Outcome Measure: The patient scored 75/100 on the Barthel Index outcome measure which is indicative of 25% impairment in self care. Other factors to consider for discharge: was living alone     PLAN :  Recommend with staff: Recommend with nursing, ADLs with supervision/setup, OOB to chair 3x/day via rolling walker and toileting via functional mobility to and from bathroom. Thank you for completing as able in order to maintain patient strength, endurance and independence. Recommendation for discharge: (in order for the patient to meet his/her long term goals)  No skilled occupational therapy/ follow up rehabilitation needs identified at this time. This discharge recommendation:  Has been made in collaboration with the attending provider and/or case management    IF patient discharges home will need the following DME: patient owns DME required for discharge       SUBJECTIVE:   Patient stated I wish I had been able to move into assisted living sooner.     OBJECTIVE DATA SUMMARY:   HISTORY:   Past Medical History: Diagnosis Date    Breast cancer ([facility] [address]     LEFT BREAST -- Lumpectomy ([facility])    Dementia ([facility] [facility] 4/12/2021    DM (diabetes mellitus) ([facility]     Environmental allergies     Fibromyalgia     GERD (gastroesophageal reflux disease)     High cholesterol     Osteoporosis     Psychiatric disorder     hx depression    Stroke [facility])     Seen incidentally    Thyroid activity decreased     goiter     Past Surgical History:   Procedure Laterality Date    ENDOSCOPY, COLON, DIAGNOSTIC  6/2013    Neg. No f/u needed. Gerldine Duane).  HX APPENDECTOMY      HX HEMORRHOIDECTOMY      HX KNEE REPLACEMENT Right 01/2016    HX TONSILLECTOMY      SD BREAST SURGERY PROCEDURE UNLISTED  6/2014    left breast lumpectomy, PARTIAL MASTECTOMY    SD SPINE SURGERY PROCEDURE UNLISTED  1/10    L spine; LAMINECTOMY, FUSION       Prior Level of Function/Environment/Context: lives in 1 level home alone, has family who are involved in her care. Expanded or extensive additional review of patient history:   Home Situation  Home Environment: Private residence  # Steps to Enter: 4  Rails to Enter: Yes  One/Two Story Residence: One story  Living Alone: Yes  Support Systems: Child(guicho)  Patient Expects to be Discharged to[de-identified] Home (Is moving into [facility] next week)  Current DME Used/Available at Home: Angelica [place], rolling,Walker, rollator,Grab bars,Shower chair  Tub or Shower Type: Shower    Hand dominance: Right    EXAMINATION OF PERFORMANCE DEFICITS:  Cognitive/Behavioral Status:  Neurologic State: Alert  Orientation Level: Oriented X4  Cognition: Appropriate for age attention/concentration; Follows commands  Perception: Appears intact  Perseveration: No perseveration noted  Safety/Judgement: Awareness of environment; Fall prevention    Skin: appears intact    Edema: none noted in BUEs    Hearing:   Auditory  Auditory Impairment: None    Vision/Perceptual:    Tracking: Able to track stimulus in all quadrants w/o difficulty Diplopia: No    Acuity: Impaired near vision    Corrective Lenses: Glasses    Range of Motion:  In BUEs  AROM: Within functional limits    Strength: In BUEs  Strength: Generally decreased, functional     Coordination:  Coordination: Generally decreased, functional  Fine Motor Skills-Upper: Left Intact; Right Intact    Gross Motor Skills-Upper: Left Intact; Right Intact    Tone & Sensation:  In BUEs  Tone: Normal  Sensation: Intact    Balance:  Sitting: Intact  Standing: Intact; With support    Functional Mobility and Transfers for ADLs:  Bed Mobility:  Rolling:  (NT this session)    Transfers:  Sit to Stand: Supervision  Stand to Sit: Supervision  Toilet Transfer : Supervision  Assistive Device : Walker, rolling    ADL Assessment:  Feeding: Independent    Oral Facial Hygiene/Grooming: Independent    Bathing: Supervision    Upper Body Dressing: Independent    Lower Body Dressing: Independent    Toileting: Supervision     ADL Intervention and task modifications:     Grooming  Position Performed: Standing  Washing Hands: Independent    Toileting  Toileting Assistance: Supervision  Bladder Hygiene: Independent  Clothing Management: Supervision  Adaptive Equipment: Grab bars    Cognitive Retraining  Safety/Judgement: Awareness of environment; Fall prevention    Functional Measure:    Barthel Index:  Bathin  Bladder: 10  Bowels: 10  Groomin  Dressing: 10  Feeding: 10  Mobility: 5  Stairs: 0  Toilet Use: 10  Transfer (Bed to Chair and Back): 10  Total: 75/100      The Barthel ADL Index: Guidelines  1. The index should be used as a record of what a patient does, not as a record of what a patient could do. 2. The main aim is to establish degree of independence from any help, physical or verbal, however minor and for whatever reason. 3. The need for supervision renders the patient not independent. 4. A patient's performance should be established using the best available evidence.  Asking the patient, friends/relatives and nurses are the usual sources, but direct observation and common sense are also important. However direct testing is not needed. 5. Usually the patient's performance over the preceding 24-48 hours is important, but occasionally longer periods will be relevant. 6. Middle categories imply that the patient supplies over 50 per cent of the effort. 7. Use of aids to be independent is allowed. Score Interpretation (from [address] [facility] [address])    Independent   60-79 Minimally independent   40-59 Partially dependent   20-39 Very dependent   <20 Totally dependent     -Corbin Oakley., Barthel, D.W. (1965). Functional evaluation: the Barthel Index. [address] ([address] [facility] [address] (1997). The Barthel activities of daily living index: self-reporting versus actual performance in the old (> or = 75 years). Journal of [address] [place] [address] 45(7), [address] [facility], EDUIN, Sunday Harman., [facility]. (1999). Measuring the change in disability after inpatient rehabilitation; comparison of the responsiveness of the Barthel Index and Functional Desha Measure. Journal of Neurology, Neurosurgery, and Psychiatry, 66(4), 328-847. Snehal Mosqueda, N.[place].A, DARVIN Gomez.LYDIA, & Catlaina Kothari, MOlgaA. (2004) Assessment of post-stroke quality of life in cost-effectiveness studies: The usefulness of the Barthel Index and the EuroQoL-5D.  Quality of Life Research, 15, 176-98       Occupational Therapy Evaluation Charge Determination   History Examination Decision-Making   LOW Complexity : Brief history review  LOW Complexity : 1-3 performance deficits relating to physical, cognitive , or psychosocial skils that result in activity limitations and / or participation restrictions  LOW Complexity : No comorbidities that affect functional and no verbal or physical assistance needed to complete eval tasks       Based on the above components, the patient evaluation is determined to be of the following complexity level: LOW   Pain Rating:  Reporting no pain    Activity Tolerance:   Good    After treatment patient left in no apparent distress:    Sitting in chair, Call bell within reach and RN aware    COMMUNICATION/EDUCATION:   The patients plan of care was discussed with: Physical therapist, Registered nurse and Physician.      Thank you for this referral.  Darvin Arango, OT  Time Calculation: 15 mins

## 2022-02-11 NOTE — PROGRESS NOTES
PHYSICAL THERAPY EVALUATION/DISCHARGE  Patient: Demi Menard (49 y.o. female)  Date: 2/11/2022  Primary Diagnosis: Abdominal pain [R10.9]  Procedure(s) (LRB):  ESOPHAGOGASTRODUODENOSCOPY (EGD) (N/A) 1 Day Post-Op   Precautions:          ASSESSMENT  Based on the objective data described below, the patient presents with functional and baseline overall strength, ROM, and balance following admission for abdominal pain. She endorses abdominal discomfort for >1 month leading to decreased activity. She, however, was able to moiblize with SBA during eval and showed good endurance. Gait training completed x180' using a RW with a slow but steady gait pattern. She is safe to mobilize with nursing assistance x1 using the RW in her room. She is planning to transition to an [facility] next week and no further PT needs identified if this is the case. If transition to residential is delayed for some reason, she may benefit from a HHPT safety eval.        Other factors to consider for discharge: moving to [facility] next week     Further skilled acute physical therapy is not indicated at this time. PLAN :  Recommendation for discharge: (in order for the patient to meet his/her long term goals)  No skilled physical therapy/ follow up rehabilitation needs identified at this time. IF patient discharges home will need the following DME: patient owns DME required for discharge       SUBJECTIVE:   Patient stated I'm glad you came. I felt like I needed to get up for some exercise.   \"I feel like I am walking like I normally do. \"    OBJECTIVE DATA SUMMARY:   HISTORY:    Past Medical History:   Diagnosis Date    Breast [facility] ([facility] [address]     LEFT BREAST -- Lumpectomy ([facility])    Dementia ([facility] [address] 4/12/2021    DM (diabetes mellitus) ([facility] [address] [facility]     Environmental allergies     Fibromyalgia     GERD (gastroesophageal reflux disease)     High cholesterol     Osteoporosis     Psychiatric disorder     hx depression    Stroke [facility])     Seen incidentally    Thyroid activity decreased     goiter     Past Surgical History:   Procedure Laterality Date    ENDOSCOPY, COLON, DIAGNOSTIC  6/2013    Neg. No f/u needed. Belén Tee).  HX APPENDECTOMY      HX HEMORRHOIDECTOMY      HX KNEE REPLACEMENT Right 01/2016    HX TONSILLECTOMY      [place] BREAST SURGERY PROCEDURE UNLISTED  6/2014    left breast lumpectomy, PARTIAL MASTECTOMY    [place] SPINE SURGERY PROCEDURE UNLISTED  1/10    L spine; LAMINECTOMY, FUSION       Prior level of function: modified independent with a rollator  Personal factors and/or comorbidities impacting plan of care: pending moving to [facility] next week    Home Situation  Home Environment: Private residence  # Steps to Enter: 4  Rails to Enter: Yes  One/Two Story Residence: One story  Living Alone: Yes  Support Systems: Child(guicho)  Patient Expects to be Discharged to[de-identified] Home (Is moving into [facility] next week)  Current DME Used/Available at Home: Theador Corpus, rolling,Walker, rollator,Shower chair,Grab bars    EXAMINATION/PRESENTATION/DECISION MAKING:   Critical Behavior:  Neurologic State: Alert  Orientation Level: Oriented X4 (occasional confusion/anxiety )        Hearing: Auditory  Auditory Impairment: None  Skin:    Edema:   Range Of Motion:  AROM: Generally decreased, functional (left knee arthritic)                       Strength:    Strength: Generally decreased, functional                    Tone & Sensation:   Tone: Normal              Sensation: Intact               Coordination:  Coordination: Generally decreased, functional  Vision:      Functional Mobility:  Bed Mobility:              Transfers:  Sit to Stand: Stand-by assistance  Stand to Sit: Stand-by assistance                       Balance:   Sitting: Intact  Standing: Intact; With support  Ambulation/Gait Training:  Distance (ft): 180 Feet (ft)  Assistive Device: Gait belt;Walker, rolling  Ambulation - Level of Assistance: Stand-by assistance     Gait Description (WDL): Exceptions to WDL  Gait Abnormalities: Decreased step clearance        Base of Support: Narrowed     Speed/Annette: Slow                     Cues for posture and device management; patient used to rollator   Stairs: Therapeutic Exercises:       Functional Measure:         Physical Therapy Evaluation Charge Determination   History Examination Presentation Decision-Making   MEDIUM  Complexity : 1-2 comorbidities / personal factors will impact the outcome/ POC  LOW Complexity : 1-2 Standardized tests and measures addressing body structure, function, activity limitation and / or participation in recreation  LOW Complexity : Stable, uncomplicated  LOW Complexity : FOTO score of       Based on the above components, the patient evaluation is determined to be of the following complexity level: LOW     Pain Rating:      Activity Tolerance:   Good      After treatment patient left in no apparent distress:   Sitting in chair and Call bell within reach    COMMUNICATION/EDUCATION:   The patients plan of care was discussed with: Registered nurse. Fall prevention education was provided and the patient/caregiver indicated understanding., Patient/family have participated as able in goal setting and plan of care. and Patient/family agree to work toward stated goals and plan of care.     Thank you for this referral.  Xuan Cardoso, PT, DPT   Time Calculation: 21 mins

## 2022-02-11 NOTE — PROGRESS NOTES
Rubén Diss, [facility]  [phone] office  [phone] voicemail     Addendum: D/W Dr. Darlene Fleming and CT was done with IV contrast but not PO contrast. Would recommend repeating CT of the abdomen/pelvis with PO contrast to evaluate for cause of ongoing nausea. If negative, can be d/c'd from GI standpoint over the weekend with plans to f/u for EUS as outpatient. Gastroenterology Progress Note    February 11, 2022  Admit Date: 2/9/2022         Narrative Assessment and Plan   · Abdominal pain, nausea, diarrhea for 1 month with abnormal CT findings of the biliary tree and pancreas, though liver enzymes and lipase remain normal  · MRCP with abnormal biliary findings (stable) and possible IPMN  · EGD with gastritis, path pending  · CEA normal  · Diarrhea presumed non-infectious due to recent negative stool studies     Plan:  PPI daily  F/U CA 19-9  Diet as tolerated  Check fecal elastase  May need EUS as outpatient for further eval of pancreas    Subjective:   · Denies pain, but has severe nausea post-prandially. No BM.       ROS:  The previous review of systems on initial consultation / H&P is noted and reviewed. Specific changes noted above in HPI.     Current Medications:     Current Facility-Administered Medications   Medication Dose Route Frequency    celecoxib (CELEBREX) capsule 200 mg  200 mg Oral DAILY    loperamide (IMODIUM) capsule 4 mg  4 mg Oral DAILY    dicyclomine (BENTYL) capsule 10 mg  10 mg Oral TID    simethicone (MYLICON) tablet 80 mg  80 mg Oral QID PRN    sodium chloride (NS) flush 5-40 mL  5-40 mL IntraVENous Q8H    sodium chloride (NS) flush 5-40 mL  5-40 mL IntraVENous PRN    pantoprazole (PROTONIX) injection 40 mg  40 mg IntraVENous DAILY    And    sodium chloride (NS) flush 10 mL  10 mL IntraVENous DAILY    buPROPion SR (WELLBUTRIN SR) tablet 150 mg  150 mg Oral BID    levothyroxine (SYNTHROID) tablet 75 mcg  75 mcg Oral ACB    sodium chloride (NS) flush 5-40 mL  5-40 mL IntraVENous Q8H    sodium chloride (NS) flush 5-40 mL  5-40 mL IntraVENous PRN    [Held by provider] morphine injection 1 mg  1 mg IntraVENous Q4H PRN    ondansetron (ZOFRAN) injection 4 mg  4 mg IntraVENous Q4H PRN    hydrALAZINE (APRESOLINE) 20 mg/mL injection 10 mg  10 mg IntraVENous Q6H PRN    [Held by provider] piperacillin-tazobactam (ZOSYN) 3.375 g in 0.9% sodium chloride (MBP/ADV) 100 mL MBP  3.375 g IntraVENous Q8H    glucose chewable tablet 16 g  4 Tablet Oral PRN    glucagon (GLUCAGEN) injection 1 mg  1 mg IntraMUSCular PRN    insulin lispro (HUMALOG) injection   SubCUTAneous Q6H    traZODone (DESYREL) tablet 50 mg  50 mg Oral QHS    LORazepam (ATIVAN) tablet 1 mg  1 mg Oral Q8H PRN    traMADoL (ULTRAM) tablet 50 mg  50 mg Oral Q6H PRN       Objective:     VITALS:   Last 24hrs VS reviewed since prior progress note. Most recent are:  Visit Vitals  BP (!) 165/81 (BP 1 Location: Right upper arm, BP Patient Position: At rest)   Pulse 81   Temp 98.1 °F (36.7 °C)   Resp 16   Wt 66.2 kg (145 lb 15.1 oz)   SpO2 100%   Breastfeeding No   BMI 25.05 kg/m²     Temp (24hrs), Av.4 °F (36.9 °C), Min:98 °F (36.7 °C), Max:98.9 °F (37.2 °C)    No intake or output data in the 24 hours ending 22 1011    EXAM:  General: Comfortable, no distress    HEENT: Atraumatic skull, pupils equal  Lungs:  No abnormal audible breath sounds. Speaking in complete sentences  Heart:  No abnormal audible heart sounds. Well perfused  Abdomen: Non-distended, non-tender.   No mass, guarding or rebound  Neurologic:  moves all 4 extremities  Psych:   anxious      Lab Data Reviewed:   Recent Labs     02/10/22  0455 22  1246 22  1121   WBC 5.9 6.4 7.0   HGB 13.8 14.8 14.4   HCT 42.0 44.5 44.4    245 245     Recent Labs     02/10/22  0455 22  1246 22  1121    137 136   K 3.4* 3.4* 3.7   * 106 106   CO2 21 24 21   * 130* 180*   BUN 9 10 13   CREA 0.94 1.07* 1.17*   CA 9.0 9.8 9.6   ALB 3.6 3.9 3.8   TBILI 0.5 0.4 0.3   ALT 24 28 23     Lab Results   Component Value Date/Time    Glucose (POC) 92 02/11/2022 06:54 AM    Glucose (POC) 78 02/11/2022 12:07 AM    Glucose (POC) 133 (H) 02/10/2022 06:36 PM    Glucose (POC) 118 (H) 02/10/2022 11:46 AM    Glucose (POC) 111 02/10/2022 07:10 AM     No results for input(s): PH, PCO2, PO2, HCO3, FIO2 in the last 72 hours. No results for input(s): INR, INREXT in the last 72 hours.         Assessment:   (See above)  Active Problems:    Abdominal pain (2/9/2022)        Plan:   (See above)      Signed By: Caitlyn Swanson NP     2/11/2022  10:11 AM

## 2022-02-11 NOTE — PROGRESS NOTES
[address] [facility]  Hospitalist Group                                                                                          Hospitalist Progress Note  Rubi Oliver MD  Answering service: [phone] from in house phone        Date of Service:  2/10/2022  NAME:  Mustapha Rodriguez  :  1932  MRN:  [id number]      Admission Summary:   Mustapha Rodriguez is a 80 y.o. female who presents with abd pain     History was primarily obtained from the patient, patient reports she has been not doing so well for the past many weeks, patient reports she has been having nausea, vomiting, abdominal pain, diarrhea associated with her symptoms, patient reports that symptoms got worse today, got concerned and decided to come to the hospital, patient had a CT of the abdomen pelvis which showed dilated CBD, concern for choledocholithiasis and patient was admitted to the hospital service       Interval history / Subjective:   Patient with numerous complaints  Reviewed her vitals, labs, test results, and care plan  Plans for EGD today     Assessment & Plan:      Abdominal pain- unclear etiology- review CT scan and MRI; EGD today  GI following    Diabetes mellitus type 2-stable glucose levels  accuchecks and ss insulin    Hyperlipidemia - holding statin meds  Psych- stable on home meds  Arthritis-resume celebrex soon       Code status: Full  Prophylaxis: SCDs  Care Plan discussed with:patient   Anticipated Disposition: 1822 Problems  Date Reviewed: 2022          Codes Class Noted POA    Abdominal pain ICD-10-CM: R10.9  ICD-9-CM: 789.00  2022 Unknown                Review of Systems:   A comprehensive review of systems was negative except for that written in the HPI. Vital Signs:    Last 24hrs VS reviewed since prior progress note.  Most recent are:  Visit Vitals  /79 (BP 1 Location: Right upper arm, BP Patient Position: At rest;Supine)   Pulse 71   Temp 98.3 °F (36.8 °C)   Resp 16   Wt 66.2 kg (145 lb 15.1 oz)   SpO2 96%   Breastfeeding No   BMI 25.05 kg/m²       No intake or output data in the 24 hours ending 02/10/22 2320     Physical Examination:     I had a face to face encounter with this patient and independently examined them on 2/10/2022 as outlined below:          Constitutional:  No acute distress, cooperative, pleasant    ENT:  Oral mucosa moist, oropharynx benign. Resp:  CTA bilaterally. No wheezing/rhonchi/rales. No accessory muscle use. CV:  Regular rhythm, normal rate, no murmurs, gallops, rubs    GI:  Soft, non distended, mild diffuse tender. normoactive bowel sounds,     Musculoskeletal:  No edema, warm, 2+ pulses throughout    Neurologic:  Moves all extremities. AAOx3, CN II-XII reviewed            Data Review:    Review and/or order of clinical lab test  Review and/or order of tests in the radiology section of CPT  Review and/or order of tests in the medicine section of CPT    MRI ABD W MRCP WO CONT     INDICATION:   abnormal CT of the biliary tree, abdominal pain, nausea     COMPARISON: Ultrasound and CT 2/8/2022. MRI lumbar spine of 5/8/2016 and  4/21/2009.     CONTRAST:  None.     TECHNIQUE: Multiplanar multisequence MRI of the abdomen was performed without  contrast including coronal 3-D respiratory triggered MRCP imaging.     FINDINGS:     VISUALIZED LOWER CHEST: Unremarkable. LIVER: Unremarkable. GALLBLADDER: Distended with no evident intraluminal stones or wall abnormality. BILIARY DUCTS: There is no significant change in mild diffuse intrahepatic  biliary dilation. The common bile duct is dilated to 11 mm with smooth nodular  tapering to the ampulla and no filling defect or mucosal abnormality. Dilation  appears unchanged relative to the visualized portion of the distal duct on  lumbar spine MRI of 2009. SPLEEN: Unremarkable.   PANCREAS: Adjacent to the distal duct in the pancreatic head there is a 5 x 9 mm  fluid signal intensity rounded structure which communicates with the main duct  through side branch. There are additional tiny foci of T2 signal intensity  within the pancreatic parenchyma of the body and tail Otherwise no mass. Pancreatic duct is upper limits normal at 3 mm with no filling defect or  stricture. No evidence of pancreas divisum. ADRENALS: Unremarkable. KIDNEYS: No mass, calculus, or hydronephrosis. STOMACH: Unremarkable. VISUALIZED BOWEL: No dilatation or wall thickening. PERITONEUM: No ascites or pneumoperitoneum. RETROPERITONEUM: No lymphadenopathy or aortic aneurysm. VISUALIZED PELVIS: Unremarkable. BONES AND SOFT TISSUES: Degenerative spine change, leftward convex lumbar  scoliosis, and susceptibility related to bilateral jennyfer and screw lumbar fixation  hardware L4-S1 with posterior decompression change. No acute fracture or  aggressive lesion. ADDITIONAL COMMENTS: N/A     IMPRESSION  Biliary ductal dilation which appears chronic and unchanged in the  distal duct since 2009 with no evident obstructing mass or stone. 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 Incidentals  as above. Labs:     Recent Labs     02/10/22  0455 02/09/22  1246   WBC 5.9 6.4   HGB 13.8 14.8   HCT 42.0 44.5    245     Recent Labs     02/10/22  0455 02/09/22  1246 02/08/22  1121    137 136   K 3.4* 3.4* 3.7   * 106 106   CO2 21 24 21   BUN 9 10 13   CREA 0.94 1.07* 1.17*   * 130* 180*   CA 9.0 9.8 9.6     Recent Labs     02/10/22  0455 02/09/22  1246 02/08/22  1121   ALT 24 28 23   AP 99 108 104   TBILI 0.5 0.4 0.3   TP 7.0 7.8 7.6   ALB 3.6 3.9 3.8   GLOB 3.4 3.9 3.8   LPSE  --  116 134     No results for input(s): INR, PTP, APTT, INREXT in the last 72 hours. No results for input(s): FE, TIBC, PSAT, FERR in the last 72 hours.    No results found for: FOL, RBCF   No results for input(s): PH, PCO2, PO2 in the last 72 hours. No results for input(s): CPK, CKNDX, TROIQ in the last 72 hours.     No lab exists for component: CPKMB  Lab Results   Component Value Date/Time    Cholesterol, total 314 (H) 04/12/2021 12:00 PM    HDL Cholesterol 59 04/12/2021 12:00 PM    LDL, calculated 203 (H) 04/12/2021 12:00 PM    Triglyceride 263 (H) 04/12/2021 12:00 PM     Lab Results   Component Value Date/Time    Glucose (POC) 133 (H) 02/10/2022 06:36 PM    Glucose (POC) 118 (H) 02/10/2022 11:46 AM    Glucose (POC) 111 02/10/2022 07:10 AM    Glucose (POC) 122 (H) 02/09/2022 10:50 PM    Glucose (POC) 112 (H) 01/19/2016 09:47 AM     Lab Results   Component Value Date/Time    Color DARK YELLOW 02/08/2022 11:21 AM    Appearance CLEAR 02/08/2022 11:21 AM    Specific gravity 1.025 02/08/2022 11:21 AM    pH (UA) 5.5 02/08/2022 11:21 AM    Protein TRACE (A) 02/08/2022 11:21 AM    Glucose Negative 02/08/2022 11:21 AM    Ketone TRACE (A) 02/08/2022 11:21 AM    Bilirubin Negative 02/08/2022 11:21 AM    Urobilinogen 0.2 02/08/2022 11:21 AM    Nitrites Negative 02/08/2022 11:21 AM    Leukocyte Esterase Negative 02/08/2022 11:21 AM    Epithelial cells MODERATE (A) 02/08/2022 11:21 AM    Bacteria Negative 02/08/2022 11:21 AM    WBC 5-10 02/08/2022 11:21 AM    RBC 0-5 02/08/2022 11:21 AM         Medications Reviewed:     Current Facility-Administered Medications   Medication Dose Route Frequency    simethicone (MYLICON) tablet 80 mg  80 mg Oral QID PRN    sodium chloride (NS) flush 5-40 mL  5-40 mL IntraVENous Q8H    sodium chloride (NS) flush 5-40 mL  5-40 mL IntraVENous PRN    pantoprazole (PROTONIX) injection 40 mg  40 mg IntraVENous DAILY    And    sodium chloride (NS) flush 10 mL  10 mL IntraVENous DAILY    buPROPion SR (WELLBUTRIN SR) tablet 150 mg  150 mg Oral BID    levothyroxine (SYNTHROID) tablet 75 mcg  75 mcg Oral ACB    sodium chloride (NS) flush 5-40 mL  5-40 mL IntraVENous Q8H    sodium chloride (NS) flush 5-40 mL 5-40 mL IntraVENous PRN    0.9% sodium chloride infusion  75 mL/hr IntraVENous CONTINUOUS    [Held by provider] morphine injection 1 mg  1 mg IntraVENous Q4H PRN    ondansetron (ZOFRAN) injection 4 mg  4 mg IntraVENous Q4H PRN    hydrALAZINE (APRESOLINE) 20 mg/mL injection 10 mg  10 mg IntraVENous Q6H PRN    piperacillin-tazobactam (ZOSYN) 3.375 g in 0.9% sodium chloride (MBP/ADV) 100 mL MBP  3.375 g IntraVENous Q8H    glucose chewable tablet 16 g  4 Tablet Oral PRN    glucagon (GLUCAGEN) injection 1 mg  1 mg IntraMUSCular PRN    insulin lispro (HUMALOG) injection   SubCUTAneous Q6H    traZODone (DESYREL) tablet 50 mg  50 mg Oral QHS    LORazepam (ATIVAN) tablet 1 mg  1 mg Oral Q8H PRN    traMADoL (ULTRAM) tablet 50 mg  50 mg Oral Q6H PRN     ______________________________________________________________________  EXPECTED LENGTH OF STAY: 2d 4h  ACTUAL LENGTH OF STAY:          1                 Marizol Horne MD

## 2022-02-12 VITALS
TEMPERATURE: 97.4 F | HEART RATE: 74 BPM | DIASTOLIC BLOOD PRESSURE: 74 MMHG | WEIGHT: 145.94 LBS | BODY MASS INDEX: 25.05 KG/M2 | OXYGEN SATURATION: 96 % | RESPIRATION RATE: 18 BRPM | SYSTOLIC BLOOD PRESSURE: 126 MMHG

## 2022-02-12 LAB
ALBUMIN SERPL-MCNC: 3.2 G/DL (ref 3.5–5)
ALBUMIN/GLOB SERPL: 1 {RATIO} (ref 1.1–2.2)
ALP SERPL-CCNC: 88 U/L (ref 45–117)
ALT SERPL-CCNC: 20 U/L (ref 12–78)
ANION GAP SERPL CALC-SCNC: 4 MMOL/L (ref 5–15)
AST SERPL-CCNC: 14 U/L (ref 15–37)
BILIRUB SERPL-MCNC: 0.3 MG/DL (ref 0.2–1)
BUN SERPL-MCNC: 9 MG/DL (ref 6–20)
BUN/CREAT SERPL: 10 (ref 12–20)
CALCIUM SERPL-MCNC: 9.2 MG/DL (ref 8.5–10.1)
CANCER AG19-9 SERPL-ACNC: 17 U/ML (ref 0–35)
CHLORIDE SERPL-SCNC: 107 MMOL/L (ref 97–108)
CO2 SERPL-SCNC: 25 MMOL/L (ref 21–32)
CREAT SERPL-MCNC: 0.86 MG/DL (ref 0.55–1.02)
GLOBULIN SER CALC-MCNC: 3.2 G/DL (ref 2–4)
GLUCOSE BLD STRIP.AUTO-MCNC: 101 MG/DL (ref 65–117)
GLUCOSE BLD STRIP.AUTO-MCNC: 78 MG/DL (ref 65–117)
GLUCOSE SERPL-MCNC: 94 MG/DL (ref 65–100)
LIPASE SERPL-CCNC: 112 U/L (ref 73–393)
POTASSIUM SERPL-SCNC: 3.5 MMOL/L (ref 3.5–5.1)
PROT SERPL-MCNC: 6.4 G/DL (ref 6.4–8.2)
SERVICE CMNT-IMP: NORMAL
SERVICE CMNT-IMP: NORMAL
SODIUM SERPL-SCNC: 136 MMOL/L (ref 136–145)

## 2022-02-12 PROCEDURE — 74011000250 HC RX REV CODE- 250: Performed by: SPECIALIST

## 2022-02-12 PROCEDURE — 74011000250 HC RX REV CODE- 250: Performed by: NURSE PRACTITIONER

## 2022-02-12 PROCEDURE — 74011250637 HC RX REV CODE- 250/637: Performed by: FAMILY MEDICINE

## 2022-02-12 PROCEDURE — C9113 INJ PANTOPRAZOLE SODIUM, VIA: HCPCS | Performed by: NURSE PRACTITIONER

## 2022-02-12 PROCEDURE — 36415 COLL VENOUS BLD VENIPUNCTURE: CPT

## 2022-02-12 PROCEDURE — 82962 GLUCOSE BLOOD TEST: CPT

## 2022-02-12 PROCEDURE — 80053 COMPREHEN METABOLIC PANEL: CPT

## 2022-02-12 PROCEDURE — 83690 ASSAY OF LIPASE: CPT

## 2022-02-12 PROCEDURE — 74011000250 HC RX REV CODE- 250: Performed by: FAMILY MEDICINE

## 2022-02-12 PROCEDURE — 74011250636 HC RX REV CODE- 250/636: Performed by: NURSE PRACTITIONER

## 2022-02-12 RX ORDER — ONDANSETRON 4 MG/1
4 TABLET, ORALLY DISINTEGRATING ORAL
Qty: 30 TABLET | Refills: 2 | Status: SHIPPED | OUTPATIENT
Start: 2022-02-12 | End: 2022-06-20 | Stop reason: SDUPTHER

## 2022-02-12 RX ORDER — LOPERAMIDE HYDROCHLORIDE 2 MG/1
4 CAPSULE ORAL DAILY
Qty: 20 CAPSULE | Refills: 0 | Status: SHIPPED
Start: 2022-02-13 | End: 2022-02-23

## 2022-02-12 RX ORDER — DICYCLOMINE HYDROCHLORIDE 10 MG/1
10 CAPSULE ORAL 3 TIMES DAILY
Qty: 90 CAPSULE | Refills: 0 | Status: SHIPPED | OUTPATIENT
Start: 2022-02-12

## 2022-02-12 RX ADMIN — SODIUM CHLORIDE, PRESERVATIVE FREE 10 ML: 5 INJECTION INTRAVENOUS at 06:00

## 2022-02-12 RX ADMIN — BUPROPION HYDROCHLORIDE 150 MG: 150 TABLET, EXTENDED RELEASE ORAL at 09:32

## 2022-02-12 RX ADMIN — PANTOPRAZOLE SODIUM 40 MG: 40 INJECTION, POWDER, FOR SOLUTION INTRAVENOUS at 09:32

## 2022-02-12 RX ADMIN — LEVOTHYROXINE SODIUM 75 MCG: 0.07 TABLET ORAL at 07:30

## 2022-02-12 RX ADMIN — SODIUM CHLORIDE, PRESERVATIVE FREE 10 ML: 5 INJECTION INTRAVENOUS at 09:33

## 2022-02-12 RX ADMIN — DICYCLOMINE HYDROCHLORIDE 10 MG: 10 CAPSULE ORAL at 09:32

## 2022-02-12 RX ADMIN — LOPERAMIDE HYDROCHLORIDE 4 MG: 2 CAPSULE ORAL at 09:32

## 2022-02-12 RX ADMIN — CELECOXIB 200 MG: 200 CAPSULE ORAL at 09:32

## 2022-02-12 NOTE — PROGRESS NOTES
[address] [facility]  Hospitalist Group                                                                                          Hospitalist Progress Note  Ronaldo Felty, MD  Answering service: [phone] from in house phone        Date of Service:  2022  NAME:  Marla Sarmiento  :  1932  MRN:  [id number]      Admission Summary:   Marla Sarmiento is a 80 y.o. female who presents with abd pain     History was primarily obtained from the patient, patient reports she has been not doing so well for the past many weeks, patient reports she has been having nausea, vomiting, abdominal pain, diarrhea associated with her symptoms, patient reports that symptoms got worse today, got concerned and decided to come to the hospital, patient had a CT of the abdomen pelvis which showed dilated CBD, concern for choledocholithiasis and patient was admitted to the hospital service       Interval history / Subjective:   Patient with numerous complaints  Reviewed her vitals, labs, test results, and care plan  Plans for repeat CT today due to persistent nausea     Assessment & Plan:      Abdominal pain- unclear etiology- review admit CT scan and MRI; EGD showing mild gastritis  GI following, cont PPI  Repeat CT with PO contrast ordered    Diabetes mellitus type 2-stable glucose levels  accuchecks and ss insulin    Hyperlipidemia - holding statin meds  Psych- stable on home meds  Arthritis-resume celebrex soon       Code status: Full  Prophylaxis: SCDs  Care Plan discussed with:patient   Anticipated Disposition: Daniel  Problems  Date Reviewed: 2022          Codes Class Noted POA    Abdominal pain ICD-10-CM: R10.9  ICD-9-CM: 789.00  2022 Unknown                Review of Systems:   A comprehensive review of systems was negative except for that written in the HPI. Vital Signs:    Last 24hrs VS reviewed since prior progress note.  Most recent are:  Visit Vitals  /78 Pulse 79   Temp 98.5 °F (36.9 °C)   Resp 16   Wt 66.2 kg (145 lb 15.1 oz)   SpO2 97%   Breastfeeding No   BMI 25.05 kg/m²       No intake or output data in the 24 hours ending 02/11/22 5713     Physical Examination:     I had a face to face encounter with this patient and independently examined them on 2/11/2022 as outlined below:          Constitutional:  No acute distress, cooperative, pleasant    ENT:  Oral mucosa moist, oropharynx benign. Resp:  CTA bilaterally. No wheezing/rhonchi/rales. No accessory muscle use. CV:  Regular rhythm, normal rate, no murmurs, gallops, rubs    GI:  Soft, non distended, mild diffuse tender. normoactive bowel sounds,     Musculoskeletal:  No edema, warm, 2+ pulses throughout    Neurologic:  Moves all extremities. AAOx3, CN II-XII reviewed            Data Review:    Review and/or order of clinical lab test  Review and/or order of tests in the radiology section of CPT  Review and/or order of tests in the medicine section of CPT    MRI ABD W MRCP WO CONT     INDICATION:   abnormal CT of the biliary tree, abdominal pain, nausea     COMPARISON: Ultrasound and CT 2/8/2022. MRI lumbar spine of 5/8/2016 and  4/21/2009.     CONTRAST:  None.     TECHNIQUE: Multiplanar multisequence MRI of the abdomen was performed without  contrast including coronal 3-D respiratory triggered MRCP imaging.     FINDINGS:     VISUALIZED LOWER CHEST: Unremarkable. LIVER: Unremarkable. GALLBLADDER: Distended with no evident intraluminal stones or wall abnormality. BILIARY DUCTS: There is no significant change in mild diffuse intrahepatic  biliary dilation. The common bile duct is dilated to 11 mm with smooth nodular  tapering to the ampulla and no filling defect or mucosal abnormality. Dilation  appears unchanged relative to the visualized portion of the distal duct on  lumbar spine MRI of 2009. SPLEEN: Unremarkable.   PANCREAS: Adjacent to the distal duct in the pancreatic head there is a 5 x 9 mm  fluid signal intensity rounded structure which communicates with the main duct  through side branch. There are additional tiny foci of T2 signal intensity  within the pancreatic parenchyma of the body and tail Otherwise no mass. Pancreatic duct is upper limits normal at 3 mm with no filling defect or  stricture. No evidence of pancreas divisum. ADRENALS: Unremarkable. KIDNEYS: No mass, calculus, or hydronephrosis. STOMACH: Unremarkable. VISUALIZED BOWEL: No dilatation or wall thickening. PERITONEUM: No ascites or pneumoperitoneum. RETROPERITONEUM: No lymphadenopathy or aortic aneurysm. VISUALIZED PELVIS: Unremarkable. BONES AND SOFT TISSUES: Degenerative spine change, leftward convex lumbar  scoliosis, and susceptibility related to bilateral jennyefr and screw lumbar fixation  hardware L4-S1 with posterior decompression change. No acute fracture or  aggressive lesion. ADDITIONAL COMMENTS: N/A     IMPRESSION  Biliary ductal dilation which appears chronic and unchanged in the  distal duct since 2009 with no evident obstructing mass or stone. 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 Incidentals  as above. Labs:     Recent Labs     02/10/22  0455 02/09/22  1246   WBC 5.9 6.4   HGB 13.8 14.8   HCT 42.0 44.5    245     Recent Labs     02/10/22  0455 02/09/22  1246    137   K 3.4* 3.4*   * 106   CO2 21 24   BUN 9 10   CREA 0.94 1.07*   * 130*   CA 9.0 9.8     Recent Labs     02/10/22  0455 02/09/22  1246   ALT 24 28   AP 99 108   TBILI 0.5 0.4   TP 7.0 7.8   ALB 3.6 3.9   GLOB 3.4 3.9   LPSE  --  116     No results for input(s): INR, PTP, APTT, INREXT, INREXT in the last 72 hours. No results for input(s): FE, TIBC, PSAT, FERR in the last 72 hours. No results found for: FOL, RBCF   No results for input(s): PH, PCO2, PO2 in the last 72 hours.   No results for input(s): CPK, CKNDX, TROIQ in the last 72 hours.     No lab exists for component: CPKMB  Lab Results   Component Value Date/Time    Cholesterol, total 314 (H) 04/12/2021 12:00 PM    HDL Cholesterol 59 04/12/2021 12:00 PM    LDL, calculated 203 (H) 04/12/2021 12:00 PM    Triglyceride 263 (H) 04/12/2021 12:00 PM     Lab Results   Component Value Date/Time    Glucose (POC) 94 02/11/2022 11:47 PM    Glucose (POC) 83 02/11/2022 05:11 PM    Glucose (POC) 100 02/11/2022 11:37 AM    Glucose (POC) 92 02/11/2022 06:54 AM    Glucose (POC) 78 02/11/2022 12:07 AM     Lab Results   Component Value Date/Time    Color DARK YELLOW 02/08/2022 11:21 AM    Appearance CLEAR 02/08/2022 11:21 AM    Specific gravity 1.025 02/08/2022 11:21 AM    pH (UA) 5.5 02/08/2022 11:21 AM    Protein TRACE (A) 02/08/2022 11:21 AM    Glucose Negative 02/08/2022 11:21 AM    Ketone TRACE (A) 02/08/2022 11:21 AM    Bilirubin Negative 02/08/2022 11:21 AM    Urobilinogen 0.2 02/08/2022 11:21 AM    Nitrites Negative 02/08/2022 11:21 AM    Leukocyte Esterase Negative 02/08/2022 11:21 AM    Epithelial cells MODERATE (A) 02/08/2022 11:21 AM    Bacteria Negative 02/08/2022 11:21 AM    WBC 5-10 02/08/2022 11:21 AM    RBC 0-5 02/08/2022 11:21 AM         Medications Reviewed:     Current Facility-Administered Medications   Medication Dose Route Frequency    celecoxib (CELEBREX) capsule 200 mg  200 mg Oral DAILY    loperamide (IMODIUM) capsule 4 mg  4 mg Oral DAILY    dicyclomine (BENTYL) capsule 10 mg  10 mg Oral TID    simethicone (MYLICON) tablet 80 mg  80 mg Oral QID PRN    sodium chloride (NS) flush 5-40 mL  5-40 mL IntraVENous Q8H    sodium chloride (NS) flush 5-40 mL  5-40 mL IntraVENous PRN    pantoprazole (PROTONIX) injection 40 mg  40 mg IntraVENous DAILY    And    sodium chloride (NS) flush 10 mL  10 mL IntraVENous DAILY    buPROPion SR (WELLBUTRIN SR) tablet 150 mg  150 mg Oral BID    levothyroxine (SYNTHROID) tablet 75 mcg  75 mcg Oral ACB    sodium chloride (NS) flush 5-40 mL  5-40 mL IntraVENous Q8H    sodium chloride (NS) flush 5-40 mL  5-40 mL IntraVENous PRN    [Held by provider] morphine injection 1 mg  1 mg IntraVENous Q4H PRN    ondansetron (ZOFRAN) injection 4 mg  4 mg IntraVENous Q4H PRN    hydrALAZINE (APRESOLINE) 20 mg/mL injection 10 mg  10 mg IntraVENous Q6H PRN    [Held by provider] piperacillin-tazobactam (ZOSYN) 3.375 g in 0.9% sodium chloride (MBP/ADV) 100 mL MBP  3.375 g IntraVENous Q8H    glucose chewable tablet 16 g  4 Tablet Oral PRN    glucagon (GLUCAGEN) injection 1 mg  1 mg IntraMUSCular PRN    insulin lispro (HUMALOG) injection   SubCUTAneous Q6H    traZODone (DESYREL) tablet 50 mg  50 mg Oral QHS    LORazepam (ATIVAN) tablet 1 mg  1 mg Oral Q8H PRN    traMADoL (ULTRAM) tablet 50 mg  50 mg Oral Q6H PRN     ______________________________________________________________________  EXPECTED LENGTH OF STAY: 2d 4h  ACTUAL LENGTH OF STAY:          2                 Cris Benoit MD

## 2022-02-12 NOTE — PROGRESS NOTES
Transition of Care Plan   RUR- 10% Moderate Risk   DISPOSITION: The disposition plan is home with family assistance.  F/U with PCP/Specialist     Transport: Family    Patient medically stable for discharge. At this time patient does not have any CM needs. Medicare pt has received, reviewed, and signed 2nd IM letter informing them of their right to appeal the discharge. Signed copy has been placed on pt bedside chart.     MAXWELL Alvarez, CRM, LMHP-e

## 2022-02-12 NOTE — DISCHARGE SUMMARY
Discharge Summary       PATIENT ID: Roxane Mckeon  MRN: [id number]   YOB: 1932    DATE OF ADMISSION: 2/9/2022 12:24 PM    DATE OF DISCHARGE: 2/12/22 11:30   PRIMARY CARE PROVIDER: Morris Briones MD     ATTENDING PHYSICIAN: Lisa Alfaro  DISCHARGING PROVIDER: Makenzie Ma MD    To contact this individual call [phone] and ask the  to page. If unavailable ask to be transferred the Adult Hospitalist Department. CONSULTATIONS: IP CONSULT TO GASTROENTEROLOGY    PROCEDURES/SURGERIES: Procedure(s):  ESOPHAGOGASTRODUODENOSCOPY (EGD)    DISCHARGE DIAGNOSES:   See below    ADMISSION SUMMARY:   Roxane Mckeon is a 80 y.o. female who presents with abd pain     History was primarily obtained from the patient, patient reports she has been not doing so well for the past many weeks, patient reports she has been having nausea, vomiting, abdominal pain, diarrhea associated with her symptoms, patient reports that symptoms got worse today, got concerned and decided to come to the hospital, patient had a CT of the abdomen pelvis which showed dilated CBD, concern for choledocholithiasis and patient was admitted to the hospital service     [facility] [id number] [facility] [address] / PLAN:      Abdominal pain- unclear etiology- reviewed admission CT scan and MRI; EGD showing mild gastritis, repeat CT scan with PO contrast reviewed.  Only abnormal finding are some cystic lesions in pancreas and dilated bile duct and pancreatic ducts  Normal LFTs and Lipase- normal CEA levels- Ca19-9 pending  GI following, cont PPI on discharge  She is tolerating PO feeding but has some lower abd pain and nausea with eating      Diabetes mellitus type 2-stable glucose levels  Stable glucose levels-      Hyperlipidemia -resume statin meds on DC  Psych- stable on home meds  Arthritis-restarted celebrex      PENDING TEST RESULTS:   At the time of discharge the following test results are still pending: EGD biopsy results and Ca19-9 levels     ADDITIONAL CARE RECOMMENDATIONS:   You were admitted with abdominal pain and nausea- of unclear etiology  Your workup did not reveal an exact cause- recommendations from gastroenterology  - soft GI diet, zofran as needed for nausea  Continue taking omeprazole to block stomach acid- gastritis noted on your EGD  Recommend follow up with Dr Marline Glez- gastroenterology in 2 weeks- for remaining lab results and for biopsy results- (Ca19-9 levels pending on discharge) - also radiology recommended repeat abdominal MRI in 1 year to monitor dilated bile and pancreatic ducts and small lesions in pancreas- 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 Continue taking immodium as needed for loose stool, dicyclomine as needed for irritable bowel symptoms     NOTIFY YOUR PHYSICIAN FOR ANY OF THE FOLLOWING:   Fever over 101 degrees for 24 hours. Chest pain, shortness of breath, fever, chills, nausea, vomiting, diarrhea, change in mentation, falling, weakness, bleeding. Severe pain or pain not relieved by medications, as well as any other signs or symptoms that you may have questions about.     FOLLOW UP APPOINTMENTS:    Follow-up Information     Follow up With Specialties Details Why Contact Info    Edvin Casas MD Gastroenterology Schedule an appointment as soon as possible for a visit in 2 weeks for Gastroenterology follow up- EGD biopsy results and pending labs Ca19-9 [address] [facility]      Jesus Garcia MD Internal Medicine   [address] [facility]   [address]  [address]  [phone]           DIET: Diabetic Diet    ACTIVITY: Activity as tolerated    EQUIPMENT needed: none    DISCHARGE MEDICATIONS:  Current Discharge Medication List      START taking these medications    Details   dicyclomine (BENTYL) 10 mg capsule Take 1 Capsule by mouth three (3) times daily. Indications: irritable colon  Qty: 90 Capsule, Refills: 0  Start date: 2/12/2022      loperamide (IMODIUM) 2 mg capsule Take 2 Capsules by mouth daily for 10 days. Qty: 20 Capsule, Refills: 0  Start date: 2/13/2022, End date: 2/23/2022      ondansetron (ZOFRAN ODT) 4 mg disintegrating tablet Take 1 Tablet by mouth every six to eight (6-8) hours as needed for Nausea. Qty: 30 Tablet, Refills: 2  Start date: 2/12/2022         CONTINUE these medications which have NOT CHANGED    Details   senna-docusate (PERICOLACE) 8.6-50 mg per tablet Take 2 Tablets by mouth daily as needed for Constipation. niacin (SLO-NIACIN) 500 mg ER Tablet Take 500 mg by mouth nightly. traZODone (DESYREL) 50 mg tablet Take 50 mg by mouth nightly. levothyroxine (SYNTHROID) 75 mcg tablet TAKE ONE TABLET BY MOUTH EVERY DAY  Qty: 90 Tablet, Refills: 3      buPROPion SR (WELLBUTRIN SR) 150 mg SR tablet Take 1 Tablet by mouth two (2) times a day. Second dose before 5pm  Qty: 60 Tablet, Refills: 5    Associated Diagnoses: Recurrent major depressive disorder, in partial remission (HCC)      traMADoL (ULTRAM) 50 mg tablet Take 1 Tablet by mouth three (3) times daily as needed for Pain for up to 90 days. Max Daily Amount: 150 mg.  Qty: 45 Tablet, Refills: 3    Associated Diagnoses: Pain      celecoxib (CELEBREX) 200 mg capsule TAKE ONE CAPSULE BY MOUTH EVERY DAY  Qty: 30 Capsule, Refills: 5      LORazepam (ATIVAN) 1 mg tablet Take 1 Tab by mouth three (3) times daily as needed for Anxiety. Max Daily Amount: 3 tablets  Qty: 90 Tablet, Refills: 5    Associated Diagnoses: Anxiety      omeprazole (PRILOSEC) 20 mg capsule TAKE ONE CAPSULE BY MOUTH AT BEDTIME  Qty: 30 Capsule, Refills: 11      acetaminophen (Tylenol Extra Strength) 500 mg tablet 2 every 8 hours  Qty: 90 Tab, Refills: prn      fexofenadine (ALLEGRA) 180 mg tablet Take 180 mg by mouth daily as needed for Allergies.   Qty: 30 Tab, Refills: 11    Associated Diagnoses: Pruritus             DISPOSITION:    Home With:   OT  PT  HH  RN       Long term SNF/Inpatient Rehab   x Independent  living    Hospice    Other:       PATIENT CONDITION AT DISCHARGE:     Functional status    Poor     Deconditioned    x Independent      Cognition     Lucid    x Forgetful     Dementia      Catheters/lines (plus indication)    [place]     PICC     PEG    x None      Code status   x  Full code     DNR      PHYSICAL EXAMINATION AT DISCHARGE:  Patient Vitals for the past 24 hrs:   Temp Pulse Resp BP SpO2   02/12/22 1058 -- -- -- (!) 149/86 --   02/12/22 0810 98.1 °F (36.7 °C) 72 16 (!) 161/90 97 %   02/12/22 0423 98 °F (36.7 °C) 64 16 115/70 97 %   02/11/22 2101 98.5 °F (36.9 °C) 79 16 128/78 97 %     Constitutional:  No acute distress, cooperative, pleasant    ENT:  Oral mucosa moist, oropharynx benign. Resp:  CTA bilaterally. No wheezing/rhonchi/rales. No accessory muscle use. CV:  Regular rhythm, normal rate, no murmurs, gallops, rubs    GI:  Soft, non distended, mild diffuse tender. normoactive bowel sounds,     Musculoskeletal:  No edema, warm, 2+ pulses throughout    Neurologic:  Moves all extremities. AAOx3, CN II-XII reviewed                                  Data Review:    Review and/or order of clinical lab test  Review and/or order of tests in the radiology section of CPT  Review and/or order of tests in the medicine section of CPT    Routine ultrasound images of the abdomen were obtained.      FINDINGS:  LIVER: No significant enlargement or evidence of parenchymal lesion. MAIN PORTAL VEIN: Patent with appropriate hepatopetal flow direction. GALLBLADDER: Gallbladder is distended. No gallstones are identified. No  sonographic Garcia sign. Gita Raddle COMMON BILE DUCT: 11.5 mm in diameter. Dilated. PANCREAS: Prominent pancreatic duct measuring 3 mm. Gita Raddle RIGHT KIDNEY: 9.1 cm in length. No hydronephrosis or nephrolithiasis. OTHER: N/A.     IMPRESSION  1.   Distended gallbladder without evidence of gallstones.     2.  Dilated common bile duct. MRI with MRCP is recommended for further  assessment. .     MRI ABD W MRCP WO CONT     INDICATION:   abnormal CT of the biliary tree, abdominal pain, nausea     COMPARISON: Ultrasound and CT 2/8/2022. MRI lumbar spine of 5/8/2016 and  4/21/2009.     CONTRAST:  None.     TECHNIQUE: Multiplanar multisequence MRI of the abdomen was performed without  contrast including coronal 3-D respiratory triggered MRCP imaging.     FINDINGS:     VISUALIZED LOWER CHEST: Unremarkable. LIVER: Unremarkable. GALLBLADDER: Distended with no evident intraluminal stones or wall abnormality. BILIARY DUCTS: There is no significant change in mild diffuse intrahepatic  biliary dilation. The common bile duct is dilated to 11 mm with smooth nodular  tapering to the ampulla and no filling defect or mucosal abnormality. Dilation  appears unchanged relative to the visualized portion of the distal duct on  lumbar spine MRI of 2009. SPLEEN: Unremarkable. PANCREAS: Adjacent to the distal duct in the pancreatic head there is a 5 x 9 mm  fluid signal intensity rounded structure which communicates with the main duct  through side branch. There are additional tiny foci of T2 signal intensity  within the pancreatic parenchyma of the body and tail Otherwise no mass. Pancreatic duct is upper limits normal at 3 mm with no filling defect or  stricture. No evidence of pancreas divisum. ADRENALS: Unremarkable. KIDNEYS: No mass, calculus, or hydronephrosis. STOMACH: Unremarkable. VISUALIZED BOWEL: No dilatation or wall thickening. PERITONEUM: No ascites or pneumoperitoneum. RETROPERITONEUM: No lymphadenopathy or aortic aneurysm. VISUALIZED PELVIS: Unremarkable. BONES AND SOFT TISSUES: Degenerative spine change, leftward convex lumbar  scoliosis, and susceptibility related to bilateral jennyfer and screw lumbar fixation  hardware L4-S1 with posterior decompression change.  No acute fracture or  aggressive lesion. ADDITIONAL COMMENTS: N/A     IMPRESSION  Biliary ductal dilation which appears chronic and unchanged in the  distal duct since 2009 with no evident obstructing mass or stone. 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 Incidentals  as above.     CT Results  (Last 48 hours)               02/11/22 1908  CT ABD PELV W CONT Final result    Impression:  1. Persistent dilatation of the common duct and mild prominence of the   pancreatic duct. Obstructing stone or mass lesion cannot be excluded. 2. Gallbladder is mildly distended but no surrounding inflammation       Narrative:  EXAM: CT ABD PELV W CONT       INDICATION: ongoing nausea       COMPARISON: 2/8/2022        CONTRAST: 100 mL of Isovue-370. ORAL CONTRAST: Was       TECHNIQUE:    Following the uneventful intravenous administration of contrast, thin axial   images were obtained through the abdomen and pelvis. Coronal and sagittal   reconstructions were generated. CT dose reduction was achieved through use of a   standardized protocol tailored for this examination and automatic exposure   control for dose modulation. FINDINGS:    LOWER THORAX: No significant abnormality in the incidentally imaged lower chest.   LIVER: No mass. BILIARY TREE: Mildly distended . Common duct measures 15 mm   SPLEEN: within normal limits. PANCREAS: Pancreatic duct is mildly prominent   ADRENALS: Unremarkable. KIDNEYS: No mass, calculus, or hydronephrosis. STOMACH: Unremarkable. SMALL BOWEL: No dilatation or wall thickening. COLON: No dilatation or wall thickening. APPENDIX: Surgically absent   PERITONEUM: No ascites or pneumoperitoneum. RETROPERITONEUM: No lymphadenopathy or aortic aneurysm. There are vascular   calcifications   REPRODUCTIVE ORGANS: Not enlarged   URINARY BLADDER: No mass or calculus.    BONES: No destructive bone lesion. ABDOMINAL WALL: No mass or hernia. ADDITIONAL COMMENTS: N/A               Labs:           Recent Labs     02/10/22  0455 02/09/22  1246   WBC 5.9 6.4   HGB 13.8 14.8   HCT 42.0 44.5    245           Recent Labs     02/10/22  0455 02/09/22  1246    137   K 3.4* 3.4*   * 106   CO2 21 24   BUN 9 10   CREA 0.94 1.07*   * 130*   CA 9.0 9.8           Recent Labs     02/10/22  0455 02/09/22  1246   ALT 24 28   AP 99 108   TBILI 0.5 0.4   TP 7.0 7.8   ALB 3.6 3.9   GLOB 3.4 3.9   LPSE  --  116      No results for input(s): INR, PTP, APTT, INREXT, INREXT in the last 72 hours. No results for input(s): FE, TIBC, PSAT, FERR in the last 72 hours. No results found for: FOL, RBCF   No results for input(s): PH, PCO2, PO2 in the last 72 hours.   No results for input(s): CPK, CKNDX, TROIQ in the last 72 hours.     No lab exists for component: CPKMB        Lab Results   Component Value Date/Time     Cholesterol, total 314 (H) 04/12/2021 12:00 PM     HDL Cholesterol 59 04/12/2021 12:00 PM     LDL, calculated 203 (H) 04/12/2021 12:00 PM     Triglyceride 263 (H) 04/12/2021 12:00 PM            Lab Results   Component Value Date/Time     Glucose (POC) 94 02/11/2022 11:47 PM     Glucose (POC) 83 02/11/2022 05:11 PM     Glucose (POC) 100 02/11/2022 11:37 AM     Glucose (POC) 92 02/11/2022 06:54 AM     Glucose (POC) 78 02/11/2022 12:07 AM            Lab Results   Component Value Date/Time     Color DARK YELLOW 02/08/2022 11:21 AM     Appearance CLEAR 02/08/2022 11:21 AM     Specific gravity 1.025 02/08/2022 11:21 AM     pH (UA) 5.5 02/08/2022 11:21 AM     Protein TRACE (A) 02/08/2022 11:21 AM     Glucose Negative 02/08/2022 11:21 AM     Ketone TRACE (A) 02/08/2022 11:21 AM     Bilirubin Negative 02/08/2022 11:21 AM     Urobilinogen 0.2 02/08/2022 11:21 AM     Nitrites Negative 02/08/2022 11:21 AM     Leukocyte Esterase Negative 02/08/2022 11:21 AM     Epithelial cells MODERATE (A) 02/08/2022 11:21 AM   Bacteria Negative 02/08/2022 11:21 AM     WBC 5-10 02/08/2022 11:21 AM     RBC 0-5 02/08/2022 11:21 AM            CHRONIC MEDICAL DIAGNOSES:  Problem List as of 2/12/2022 Date Reviewed: 1/31/2022          Codes Class Noted - Resolved    Abdominal pain ICD-10-CM: R10.9  ICD-9-CM: 789.00  2/9/2022 - Present        Dementia ([address] ICD-10-CM: F03.90  ICD-9-CM: 294.20  4/12/2021 - Present        Vaginal irritation ICD-10-CM: N89.8  ICD-9-CM: 623.9  2/23/2021 - Present        Hemorrhoids, unspecified hemorrhoid type ICD-10-CM: K64.9  ICD-9-CM: 455.6  2/23/2021 - Present        Prediabetes ICD-10-CM: R73.03  ICD-9-CM: 790.29  2/5/2021 - Present        Memory impairment ICD-10-CM: R41.3  ICD-9-CM: 780.93  2/5/2021 - Present        Chronic depression ICD-10-CM: F32. A  ICD-9-CM: 321  2/5/2021 - Present        Anxiety ICD-10-CM: F41.9  ICD-9-CM: 300.00  2/5/2021 - Present        Urge incontinence of urine ICD-10-CM: N39.41  ICD-9-CM: 788.31  2/5/2021 - Present        Gait instability ICD-10-CM: R26.81  ICD-9-CM: 781.2  2/5/2021 - Present        Chronic constipation ICD-10-CM: K59.09  ICD-9-CM: 564.00  2/5/2021 - Present        History of breast cancer ICD-10-CM: Z85.3  ICD-9-CM: V10.3  2/5/2021 - Present        History of total right knee replacement (TKR) ICD-10-CM: S78.772  ICD-9-CM: V43.65  2/5/2021 - Present        Status post left breast lumpectomy ICD-10-CM: Z98.890  ICD-9-CM: V45.89  2/5/2021 - Present        Type 2 diabetes with nephropathy ([facility] ICD-10-CM: E11.21  ICD-9-CM: 250.40, 583.81  9/27/2018 - Present        Primary osteoarthritis of right knee ICD-10-CM: M17.11  ICD-9-CM: 715.16  1/19/2016 - Present        Encounter for long-term (current) drug use ICD-10-CM: Z79.899  ICD-9-CM: V58.69  11/24/2015 - Present        Breast cancer ([facility] (Chronic) ICD-10-CM: C50.919  ICD-9-CM: 174.9  1/23/2015 - Present        High cholesterol ICD-10-CM: E78.00  ICD-9-CM: 272.0  Unknown - Present        Acquired hypothyroidism ICD-10-CM: E03.9  ICD-9-CM: 244.9  Unknown - Present        GERD (gastroesophageal reflux disease) ICD-10-CM: K21.9  ICD-9-CM: 530.81  Unknown - Present        RESOLVED: Primary osteoarthritis involving multiple joints ICD-10-CM: M89.49  ICD-9-CM: 715.98  2/5/2021 - 4/12/2021        RESOLVED: Laceration of right lower extremity ICD-10-CM: V70.179Y  ICD-9-CM: 894.0  2/20/2020 - 4/12/2021        RESOLVED: Non-pressure chronic ulcer of right lower leg with muscle involvement without evidence of necrosis ([address] [facility] ICD-10-CM: L97.915  ICD-9-CM: 707.10  2/20/2020 - 4/12/2021        RESOLVED: Hypothyroidism due to acquired atrophy of thyroid ICD-10-CM: E03.4  ICD-9-CM: 244.8, 246.8  1/14/2016 - 4/12/2021        RESOLVED: Type 2 diabetes mellitus without complication ([facility] MN-16-HP: E11.9  ICD-9-CM: 250.00  1/14/2016 - 3/17/2019        RESOLVED: DM (diabetes mellitus) ([address] ICD-10-CM: E11.9  ICD-9-CM: 250.00  Unknown - 1/14/2016        RESOLVED: Encounter for long-term (current) use of other medications ICD-10-CM: Z79.899  ICD-9-CM: V58.69  5/18/2012 - 11/24/2015              Greater than 30 minutes were spent with the patient on counseling and coordination of care    Signed:   Benson Hollis MD  2/12/2022  11:28 AM   .

## 2022-02-12 NOTE — DISCHARGE INSTRUCTIONS
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    Continue taking immodium as needed for loose stool, dicyclomine as needed for irritable bowel symptoms

## 2022-02-14 ENCOUNTER — PATIENT OUTREACH (OUTPATIENT)
Dept: CASE MANAGEMENT | Age: 87
End: 2022-02-14

## 2022-02-14 NOTE — ACP (ADVANCE CARE PLANNING)
Patient's son, Deloris Quiñones (on PHI dated 3-3-2021), states patient does have a current ACP document; however patient's ACP document is not currently scanned into patient's chart.

## 2022-02-14 NOTE — PROGRESS NOTES
Care Transitions Initial Call    Call within 2 business days of discharge: Yes     Patient: Angelo Uriarte Patient : 1932 MRN: [id number]    Last Discharge 12[facility]  Facility       Complaint Diagnosis Description Type Department Provider    22 Abdominal Pain; Diarrhea Intractable abdominal pain . .. ED to Hosp-Admission (Discharged) (ADMIT) [id number] Bertha Jorge MD; Sarah Lanier. .. Was this an external facility discharge? No     Challenges to be reviewed by the provider   Additional needs identified to be addressed with provider:  yes  - Son states patient continues to experience \"Some lower abdominal pain and nausea. \" Son states patient has not vomited since discharge. - Son states patient is no longer taking Allegra 180mg tab, take 180mg daily as needed for allergies.   Please consider removing Allegra from patient's current medication list.        Method of communication with provider:  chart routing    Component      Latest Ref Rng & Units 2022 2/10/2022 2022          12:43 AM  4:55 AM 12:46 PM   Anion gap      5 - 15 mmol/L 4 (L) 8 7     Component      Latest Ref Rng & Units 2022 2/10/2022 2022          12:43 AM  4:55 AM 12:46 PM   BUN/Creatinine ratio      12 - 20   10 (L) 10 (L) 9 (L)     Component      Latest Ref Rng & Units 2022 2/10/2022 2022          12:43 AM  4:55 AM 12:46 PM   AST      15 - 37 U/L 14 (L) 17 22     Component      Latest Ref Rng & Units 2022 2/10/2022 2022          12:43 AM  4:55 AM 12:46 PM   Albumin      3.5 - 5.0 g/dL 3.2 (L) 3.6 3.9     Component      Latest Ref Rng & Units 2022          12:17 AM   Hemoglobin A1c, (calculated)      4.0 - 5.6 % 6.6 (H)   Est. average glucose      mg/dL 143     Component      Latest Ref Rng & Units 2/10/2022 2022           4:55 AM 12:46 PM   MONOCYTES      5 - 13 % 14 (H) 12     Component      Latest Ref Rng & Units 2/10/2022 2022           4:55 AM 12:46 PM   BASOPHILS      0 - 1 % 2 (H) 1     Discussed COVID-19 related testing which was available at this time. Test results were negative. Patient informed of results, if available? N/A, telephonic assessment completed with patient's son, Carol Chang (on [address] [place] [address] Road dated 3-3-2021). Advance Care Planning:   Does patient have an Advance Directive:  yes; reviewed and current per son. Inpatient Readmission Risk score: Unplanned Readmit Risk Score: 9.5 ( )    Was this a readmission? no   Patient stated reason for the admission:  N/A, telephonic assessment completed with patient's son, Carol Chang (on [address] [place] [address] Road dated 3-3-2021). Patients top risk factors for readmission: Medical condition - diabetes with nephropathy, dementia, confusion, history of left sided lacunar infarction, delirium, history of breast cancer, high cholesterol, recent abdominal pain, and recent gastritis per chart. Interventions to address risk factors: Obtained and reviewed discharge summary and/or continuity of care documents and Education of patient/family/caregiver/guardian to support self-management-Education provided regarding signs/symptomos of abdominal pain, gastritis, nausea and vomiting, patient's son verbalized an understanding. Care Transition Nurse (CTN) contacted the patient's son, Carol Chang (on [address] [place] [address] Road dated 3-3-2021), by telephone to perform post hospital discharge assessment. Verified name and  with patient's son as identifiers. Provided introduction to self, and explanation of the CTN role. CTN reviewed discharge instructions, medical action plan and red flags with the son who verbalized understanding. Were discharge instructions available to patient? yes. Reviewed appropriate site of care based on symptoms and resources available to patient including: PCP, Specialist and When to call 911. Patient's son was given an opportunity to ask questions and does not have any further questions or concerns at this time.  The son agrees to contact the PCP office for questions related to patient's healthcare. Medication reconciliation was performed with the son, who verbalizes understanding of administration of home medications. Advised obtaining a 90-day supply of all daily and as-needed medications. Referral to Pharm D needed: no     Home Health/Outpatient orders at discharge: [address]: n/a  Date of initial visit: [facility] ordered at discharge: None  [address]: n/a  Durable Medical Equipment received: n/a    Covid Risk Education    Educated patient's son about risk for severe COVID-19 due to risk factors according to CDC guidelines. CTN reviewed discharge instructions, medical action plan and red flag symptoms with the patient's son who verbalized understanding. Discussed COVID vaccination status: yes. Education provided on COVID-19 vaccination as appropriate. Discussed exposure protocols and quarantine with CDC Guidelines. The son was given an opportunity to verbalize any questions and concerns and agrees to contact CTN or health care provider for questions related to patient's healthcare. Was patient discharged with a pulse oximeter? No.     Discussed follow-up appointments. If no appointment was previously scheduled, appointment scheduling offered: yes. Is follow up appointment scheduled within 7 days of discharge? No, patient's son states he will call the office of Dr. Nereida Johnson/PCP today to schedule patient's MAGGIE appointment. [facility] follow up appointment(s): No future appointments. Non-[facility] follow up appointment(s): Patient's son states he will call the office of Dr. Derrick Lake today to schedule patient's 2-week follow-up appointement. Phone numbers for the offices of Dr. Nereida Johnson/PCP, Dr. Jose Duong, and Dr. Rod Ariza were given to patient's son. Plan for follow-up call in 10-14 days based on severity of symptoms and risk factors.   Plan for next call: self management-Review red flags of gastritis, abdominal pain, nausea and vomiting, and follow up appointment-Evaluate if patient is attending follow-up appointments as recommended on After Visit Summary, offer assistance with scheduling as needed. CTN provided contact information for future needs. Goals Addressed                 This Visit's Progress     Understands red flags post discharge. 2-: Red flags of gastritis, abdominal pain, nausea and vomiting reviewed with patient's son, Deloris Quiñones (on [address] dated 3-3-2021), and son verbalized an understanding. Son states patient continues to experience \"Some lower abdominal pain and nausea;\" however son states patient has not vomited since discharge on 2-12-22. Son denies patient having chest pain, denies shortness of breath, and denies fever/shills since discharge. Son reports patient is utilizing a soft regular diet at home, appetite is fair. Son states patient has had one fall in the last 12 months, and states patient did not sustain traumatic injury with this one fall. Care Transitions Nurse will review red flags again on next phone conversation with patient/son.   Juice Yung

## 2022-02-28 ENCOUNTER — TRANSCRIBE ORDER (OUTPATIENT)
Dept: REGISTRATION | Age: 87
End: 2022-02-28

## 2022-02-28 ENCOUNTER — HOSPITAL ENCOUNTER (OUTPATIENT)
Dept: PREADMISSION TESTING | Age: 87
Discharge: HOME | End: 2022-02-28
Attending: INTERNAL MEDICINE
Payer: MEDICARE

## 2022-02-28 DIAGNOSIS — U07.1 COVID-19: ICD-10-CM

## 2022-02-28 DIAGNOSIS — U07.1 COVID-19: Primary | ICD-10-CM

## 2022-02-28 PROCEDURE — U0005 INFEC AGEN DETEC AMPLI PROBE: HCPCS

## 2022-03-02 LAB
SARS-COV-2, XPLCVT: NOT DETECTED
SOURCE, COVRS: NORMAL

## 2022-03-04 ENCOUNTER — ANESTHESIA (OUTPATIENT)
Dept: ENDOSCOPY | Age: 87
End: 2022-03-04
Payer: MEDICARE

## 2022-03-04 ENCOUNTER — HOSPITAL ENCOUNTER (OUTPATIENT)
Age: 87
Setting detail: OUTPATIENT SURGERY
Discharge: HOME | End: 2022-03-04
Attending: INTERNAL MEDICINE | Admitting: INTERNAL MEDICINE
Payer: MEDICARE

## 2022-03-04 ENCOUNTER — APPOINTMENT (OUTPATIENT)
Dept: ULTRASOUND IMAGING | Age: 87
End: 2022-03-04
Attending: INTERNAL MEDICINE
Payer: MEDICARE

## 2022-03-04 ENCOUNTER — ANESTHESIA EVENT (OUTPATIENT)
Dept: ENDOSCOPY | Age: 87
End: 2022-03-04
Payer: MEDICARE

## 2022-03-04 VITALS
WEIGHT: 142 LBS | SYSTOLIC BLOOD PRESSURE: 140 MMHG | DIASTOLIC BLOOD PRESSURE: 77 MMHG | OXYGEN SATURATION: 95 % | BODY MASS INDEX: 24.24 KG/M2 | RESPIRATION RATE: 13 BRPM | HEART RATE: 78 BPM | HEIGHT: 64 IN | TEMPERATURE: 97.8 F

## 2022-03-04 PROCEDURE — 76040000019: Performed by: INTERNAL MEDICINE

## 2022-03-04 PROCEDURE — 76060000031 HC ANESTHESIA FIRST 0.5 HR: Performed by: INTERNAL MEDICINE

## 2022-03-04 PROCEDURE — 74011000250 HC RX REV CODE- 250: Performed by: ANESTHESIOLOGY

## 2022-03-04 PROCEDURE — 74011250636 HC RX REV CODE- 250/636: Performed by: ANESTHESIOLOGY

## 2022-03-04 RX ORDER — NALOXONE HYDROCHLORIDE 0.4 MG/ML
0.4 INJECTION, SOLUTION INTRAMUSCULAR; INTRAVENOUS; SUBCUTANEOUS
Status: DISCONTINUED | OUTPATIENT
Start: 2022-03-04 | End: 2022-03-04 | Stop reason: HOSPADM

## 2022-03-04 RX ORDER — PROPOFOL 10 MG/ML
INJECTION, EMULSION INTRAVENOUS AS NEEDED
Status: DISCONTINUED | OUTPATIENT
Start: 2022-03-04 | End: 2022-03-04 | Stop reason: HOSPADM

## 2022-03-04 RX ORDER — EPINEPHRINE 0.1 MG/ML
1 INJECTION INTRACARDIAC; INTRAVENOUS
Status: DISCONTINUED | OUTPATIENT
Start: 2022-03-04 | End: 2022-03-04 | Stop reason: HOSPADM

## 2022-03-04 RX ORDER — FLUMAZENIL 0.1 MG/ML
0.2 INJECTION INTRAVENOUS
Status: DISCONTINUED | OUTPATIENT
Start: 2022-03-04 | End: 2022-03-04 | Stop reason: HOSPADM

## 2022-03-04 RX ORDER — DEXTROMETHORPHAN/PSEUDOEPHED 2.5-7.5/.8
1.2 DROPS ORAL
Status: DISCONTINUED | OUTPATIENT
Start: 2022-03-04 | End: 2022-03-04 | Stop reason: HOSPADM

## 2022-03-04 RX ORDER — ATROPINE SULFATE 0.1 MG/ML
0.5 INJECTION INTRAVENOUS
Status: DISCONTINUED | OUTPATIENT
Start: 2022-03-04 | End: 2022-03-04 | Stop reason: HOSPADM

## 2022-03-04 RX ORDER — SODIUM CHLORIDE 9 MG/ML
50 INJECTION, SOLUTION INTRAVENOUS CONTINUOUS
Status: DISCONTINUED | OUTPATIENT
Start: 2022-03-04 | End: 2022-03-04 | Stop reason: HOSPADM

## 2022-03-04 RX ORDER — SODIUM CHLORIDE 0.9 % (FLUSH) 0.9 %
5-40 SYRINGE (ML) INJECTION AS NEEDED
Status: DISCONTINUED | OUTPATIENT
Start: 2022-03-04 | End: 2022-03-04 | Stop reason: HOSPADM

## 2022-03-04 RX ORDER — SODIUM CHLORIDE 9 MG/ML
INJECTION, SOLUTION INTRAVENOUS
Status: DISCONTINUED | OUTPATIENT
Start: 2022-03-04 | End: 2022-03-04 | Stop reason: HOSPADM

## 2022-03-04 RX ORDER — LIDOCAINE HYDROCHLORIDE 20 MG/ML
INJECTION, SOLUTION EPIDURAL; INFILTRATION; INTRACAUDAL; PERINEURAL AS NEEDED
Status: DISCONTINUED | OUTPATIENT
Start: 2022-03-04 | End: 2022-03-04 | Stop reason: HOSPADM

## 2022-03-04 RX ADMIN — PROPOFOL 20 MG: 10 INJECTION, EMULSION INTRAVENOUS at 13:35

## 2022-03-04 RX ADMIN — PROPOFOL 20 MG: 10 INJECTION, EMULSION INTRAVENOUS at 13:42

## 2022-03-04 RX ADMIN — SODIUM CHLORIDE: 900 INJECTION, SOLUTION INTRAVENOUS at 13:23

## 2022-03-04 RX ADMIN — PROPOFOL 50 MG: 10 INJECTION, EMULSION INTRAVENOUS at 13:32

## 2022-03-04 RX ADMIN — PROPOFOL 20 MG: 10 INJECTION, EMULSION INTRAVENOUS at 13:44

## 2022-03-04 RX ADMIN — PROPOFOL 20 MG: 10 INJECTION, EMULSION INTRAVENOUS at 13:40

## 2022-03-04 RX ADMIN — LIDOCAINE HYDROCHLORIDE 60 MG: 20 INJECTION, SOLUTION EPIDURAL; INFILTRATION; INTRACAUDAL; PERINEURAL at 13:32

## 2022-03-04 RX ADMIN — PROPOFOL 20 MG: 10 INJECTION, EMULSION INTRAVENOUS at 13:37

## 2022-03-04 NOTE — DISCHARGE INSTRUCTIONS
[address] [facility].  [address]  [phone]                     DISCHARGE INSTRUCTIONS    Sylvia Pérez  [id number]  6/9/1932    DISCOMFORT:  Sore throat- throat lozenges or warm salt water gargle  redness at IV site- apply warm compress to area; if redness or soreness persist- contact your physician  Gaseous discomfort- walking, belching will help relieve any discomfort    DIET  You may eat and drink after you leave. You may resume your regular diet - however -  remember your colon is empty and a heavy meal will produce gas. Avoid these foods:  vegetables, fried / greasy foods, carbonated drinks   You may not drink alcoholic beverages for at least 12 hours    ACTIVITY  You may resume your normal daily activities   Spend the remainder of the day resting -  avoid any strenuous activity. You may not operate a vehicle for 12 hours  You may not engage in an occupation involving machinery or appliances for rest of today  Avoid making any critical decisions for at least 24 hour    CALL M.D. ANY SIGN OF   Increasing pain, nausea, vomiting  Abdominal distension (swelling)  New increased bleeding (oral or rectal)  Fever (chills)  Pain in chest area  Bloody discharge from nose or mouth  Shortness of breath    Follow-up Instructions:   Call Dr. Toby Shultz for any questions or problems. If we took a biopsy please call the office within 2 weeks to discuss your pathology results.  Telephone # [phone]       ENDOSCOPY FINDINGS:   Schatzki's Ring  Hiatal Hernia  Pancreatic Cyst       Post-procedure recommendations:   -Continue current medications  -An ideal situation would need ERCP but given her age and lack of any liver enzyme abnormalities, would defer the procedure  -Start Amitiza 24 MCG twice daily (E prescription sent to pharmacy)  -Follow-up in office prn      Learning About Coronavirus (COVID-19)  Coronavirus (COVID-19): Overview  What is coronavirus (COVID-19)? The coronavirus disease (COVID-19) is caused by a virus. It is an illness that was first found in [place], [place], in December 2019. It has since spread worldwide. The virus can cause fever, cough, and trouble breathing. In severe cases, it can cause pneumonia and make it hard to breathe without help. It can cause death. Coronaviruses are a large group of viruses. They cause the common cold. They also cause more serious illnesses like Middle East respiratory syndrome (MERS) and severe acute respiratory syndrome (SARS). COVID-19 is caused by a novel coronavirus. That means it's a new type that has not been seen in people before. This virus spreads person-to-person through droplets from coughing and sneezing. It can also spread when you are close to someone who is infected. And it can spread when you touch something that has the virus on it, such as a doorknob or a tabletop. What can you do to protect yourself from coronavirus (COVID-19)? The best way to protect yourself from getting sick is to:  · Avoid areas where there is an outbreak. · Avoid contact with people who may be infected. · Wash your hands often with soap or alcohol-based hand sanitizers. · Avoid crowds and try to stay at least 6 feet away from other people. · Wash your hands often, especially after you cough or sneeze. Use soap and water, and scrub for at least 20 seconds. If soap and water aren't available, use an alcohol-based hand . · Avoid touching your mouth, nose, and eyes. What can you do to avoid spreading the virus to others? To help avoid spreading the virus to others:  · Cover your mouth with a tissue when you cough or sneeze. Then throw the tissue in the trash. · Use a disinfectant to clean things that you touch often. · Stay home if you are sick or have been exposed to the virus. Don't go to school, work, or public areas. And don't use public transportation.   · If you are sick:  ? Leave your home only if you need to get medical care. But call the doctor's office first so they know you're coming. And wear a face mask, if you have one.  ? If you have a face mask, wear it whenever you're around other people. It can help stop the spread of the virus when you cough or sneeze. ? Clean and disinfect your home every day. Use household  and disinfectant wipes or sprays. Take special care to clean things that you grab with your hands. These include doorknobs, remote controls, phones, and handles on your refrigerator and microwave. And don't forget countertops, tabletops, bathrooms, and computer keyboards. When to call for help  Call 911 anytime you think you may need emergency care. For example, call if:  · You have severe trouble breathing. (You can't talk at all.)  · You have constant chest pain or pressure. · You are severely dizzy or lightheaded. · You are confused or can't think clearly. · Your face and lips have a blue color. · You pass out (lose consciousness) or are very hard to wake up. Call your doctor now if you develop symptoms such as:  · Shortness of breath. · Fever. · Cough. If you need to get care, call ahead to the doctor's office for instructions before you go. Make sure you wear a face mask, if you have one, to prevent exposing other people to the virus. Where can you get the latest information? The following health organizations are tracking and studying this virus. Their websites contain the most up-to-date information. Aura Bernal also learn what to do if you think you may have been exposed to the virus. · U.S. Centers for Disease Control and Prevention (CDC): The CDC provides updated news about the disease and travel advice. The website also tells you how to prevent the spread of infection. www.cdc.gov  · World Health Organization [facility]): WHO offers information about the virus outbreaks.  WHO also has travel advice. www.who.int  Current as of: April 1, 2020               Content Version: 12.4  © 4654-9278 [employer]orporated. Care instructions adapted under license by your healthcare professional. If you have questions about a medical condition or this instruction, always ask your healthcare professional. [address] any warranty or liability for your use of this information.

## 2022-03-04 NOTE — H&P
[address] [facility] [address]  [address] [facility] [address] [facility], [address]   [phone]                                History and Physical     NAME: Rhonda Aguirre   :  1932   MRN:  [id number]     HPI:  The patient was seen and examined. Past Surgical History:   Procedure Laterality Date    ENDOSCOPY, COLON, DIAGNOSTIC  2013    Neg. No f/u needed. Berna Garcia).     HX APPENDECTOMY      HX HEMORRHOIDECTOMY      HX KNEE REPLACEMENT Right 2016    HX TONSILLECTOMY      MA BREAST SURGERY PROCEDURE UNLISTED  2014    left breast lumpectomy, PARTIAL MASTECTOMY    MA SPINE SURGERY PROCEDURE UNLISTED  1/10    L spine; LAMINECTOMY, FUSION     Past Medical History:   Diagnosis Date    Breast cancer ([facility]     LEFT BREAST -- Lumpectomy ([place])    Dementia ([facility] [address] [facility] 2021    DM (diabetes mellitus) ([facility]     Environmental allergies     Fibromyalgia     GERD (gastroesophageal reflux disease)     High cholesterol     Osteoporosis     Psychiatric disorder     hx depression    Stroke [facility])     Seen incidentally    Thyroid activity decreased     goiter     Social History     Tobacco Use    Smoking status: Former Smoker     Packs/day: 0.00     Quit date: 1964     Years since quittin.4    Smokeless tobacco: Never Used   Substance Use Topics    Alcohol use: No    Drug use: No     Allergies   Allergen Reactions    Adhesive Rash    Mold Other (comments)     GRASS, WEEDS, DUST: CONGESTION    Statins-Hmg-Coa Reductase Inhibitors Myalgia    Welchol [Colesevelam] Myalgia    Zetia [Ezetimibe] Myalgia    Anastrozole Rash     Family History   Problem Relation Age of Onset    Heart Disease Mother     Cancer Father         pancreatic    Heart Disease Brother 46        CAD    Hypertension Brother     Stroke Brother     Heart Disease Maternal Grandfather     Heart Disease Brother     Hypertension Brother     Stroke Brother     Heart Disease Brother     Hypertension Brother  Cancer Brother         liver    Heart Disease Brother     Cancer Brother         lung    Heart Surgery Brother     Hypertension Brother     Hypertension Sister     Cancer Sister         colon    Cancer Sister         lung    Anesth Problems Neg Hx      No current facility-administered medications for this encounter. PHYSICAL EXAM:  General: WD, WN. Alert, cooperative, no acute distress    HEENT: NC, Atraumatic. PERRLA, EOMI. Anicteric sclerae. Lungs:  CTA Bilaterally. No Wheezing/Rhonchi/Rales. Heart:  Regular  rhythm,  No murmur, No Rubs, No Gallops  Abdomen: Soft, Non distended, Non tender. +Bowel sounds, no HSM  Extremities: No c/c/e  Neurologic:  CN 2-12 gi, Alert and oriented X 3. No acute neurological distress   Psych:   Good insight. Not anxious nor agitated. The heart, lungs and mental status were satisfactory for the administration of MAC sedation and for the procedure. Mallampati score: 2     The patient was counseled at length about the risks of gabbi Covid-19 in the kay-operative and post-operative states including the recovery window of their procedure. The patient was made aware that gabbi Covid-19 after a surgical procedure may worsen their prognosis for recovering from the virus and lend to a higher morbidity and or mortality risk. The patient was given the options of postponing their procedure. All of the risks, benefits, and alternatives were discussed. The patient does  wish to proceed with the procedure.       Assessment:   · Dilated bile duct  · Pancreatic cystic lesion    Plan:   · Endoscopic procedure  · MAC sedation   ·

## 2022-03-04 NOTE — PROCEDURES
[address]  [address], [address]  [phone]                                Endoscopic Ultrasound    NAME:  Demi Menard   :   1932   MRN:   [id number]       Date/Time:  3/4/2022   Procedure Type: Linear Upper EUS      Indications: Pancreatic cyst, Abnormal CT scan showing dilated bile duct    Pre-operative Diagnosis: see indication above    Post-operative Diagnosis:  See findings below    : Jared Cruz MD    Surgical Assistant: Endoscopy Technician-1: Delphine Heredia  Endoscopy RN-1: Jose Cason RN    Implants: none    Referring Provider: Yamile eMtz MD    Anethesia/Sedation:  MAC anesthesia      Procedure Details   After infom consent was obtained for the procedure, with all risks and benefits of procedure explained the patient was taken to the endoscopy suite and placed in the left lateral decubitus position. Following sequential administration of sedation as per above, the linear echoendoscope was inserted into the mouth and advanced under direct vision to second portion of the duodenum. A careful inspection was made as the gastroscope was withdrawn, including a retroflexed view of the proximal stomach; findings and interventions are described below. Findings:     Endoscopic:   Esophagus: Normal mucosa esophagus. Small sliding hiatal hernia was noted. Nonobstructing Schatzki's ring was noted   Stomach: normal    Duodenum/jejunum: normal    Ultrasound:   Esophagus: not examined   Stomach: not examined   Pancreas:     Areas examined: the entire gland    Parenchyma: -Normal pancreatic parenchyma. Small 3 to 5 mm thin-walled cystic lesions were noted in the pancreas head and genu. Communication with the pancreatic sidebranch was noted. No mass lesion or mural nodule was noted. Main pancreatic duct was normal    Pancreatic Duct: normal findings   Liver:     Parenchyma: not examined    Gallbladder: Diffusely dilated.  No wall thickening or mass lesion was noted. No stones were noted. Bile Duct: the common bile duct was diffusely dilated and measured about 11 mm in the largest diameter. No filling defect or stricture was noted. Smooth tapering into the ampulla was noted. No ampullary lesion was noted. Lymph Node: no adenopathy        Specimen Removed:  None    Complications: None. EBL:  None.     Interventions: none      Recommendations:   -Continue current medications  -An ideal situation would need ERCP but given her age and lack of any liver enzyme abnormalities, would defer the procedure  -Start Amitiza 24 MCG twice daily (E prescription sent to pharmacy)  -Follow-up in office holger Suárez MD  3/4/2022  1:53 PM

## 2022-03-04 NOTE — ANESTHESIA POSTPROCEDURE EVALUATION
Post-Anesthesia Evaluation and Assessment    Patient: Pat Meraz MRN: [id number]  SSN: [id number]    YOB: 1932  Age: 80 y.o. Sex: female      I have evaluated the patient and they are stable and ready for discharge from the PACU. Cardiovascular Function/Vital Signs  Visit Vitals  BP (!) 150/76   Pulse 74   Temp 36.6 °C (97.8 °F)   Resp 19   Ht 5' 4\" (1.626 m)   Wt 64.4 kg (142 lb)   SpO2 97%   BMI 24.37 kg/m²       Patient is status post MAC anesthesia for Procedure(s):  ENDOSCOPIC ULTRASOUND (EUS) (Linear)   :-.    Nausea/Vomiting: None    Postoperative hydration reviewed and adequate. Pain:  Pain Scale 1: Numeric (0 - 10) (03/04/22 1326)  Pain Intensity 1: 6 (03/04/22 1326)   Managed    Neurological Status: At baseline    Mental Status, Level of Consciousness: Alert and  oriented to person, place, and time    Pulmonary Status:   O2 Device: CO2 nasal cannula (03/04/22 1346)   Adequate oxygenation and airway patent    Complications related to anesthesia: None    Post-anesthesia assessment completed.  No concerns    Signed By: Scott Rivas MD     March 4, 2022

## 2022-03-04 NOTE — PERIOP NOTES

## 2022-06-10 PROBLEM — D69.2 SENILE PURPURA (HCC): Status: ACTIVE | Noted: 2022-06-10

## 2022-07-05 PROBLEM — N18.31 STAGE 3A CHRONIC KIDNEY DISEASE (HCC): Status: ACTIVE | Noted: 2022-07-05

## 2022-08-23 PROBLEM — F13.20 BENZODIAZEPINE DEPENDENCE (HCC): Status: ACTIVE | Noted: 2022-08-23

## 2022-09-19 ENCOUNTER — TRANSCRIBE ORDER (OUTPATIENT)
Dept: SCHEDULING | Age: 87
End: 2022-09-19

## 2022-09-19 DIAGNOSIS — M54.16 LUMBAR RADICULOPATHY: Primary | ICD-10-CM

## 2022-09-29 ENCOUNTER — HOSPITAL ENCOUNTER (OUTPATIENT)
Dept: MRI IMAGING | Age: 87
Discharge: HOME | End: 2022-09-29
Attending: SPECIALIST
Payer: MEDICARE

## 2022-09-29 DIAGNOSIS — M54.16 LUMBAR RADICULOPATHY: ICD-10-CM

## 2022-09-29 PROCEDURE — 72148 MRI LUMBAR SPINE W/O DYE: CPT

## (undated) DEVICE — TUBING HYDR IRR --